# Patient Record
Sex: FEMALE | Race: BLACK OR AFRICAN AMERICAN | Employment: OTHER | URBAN - METROPOLITAN AREA
[De-identification: names, ages, dates, MRNs, and addresses within clinical notes are randomized per-mention and may not be internally consistent; named-entity substitution may affect disease eponyms.]

---

## 2019-07-30 ENCOUNTER — HOSPITAL ENCOUNTER (EMERGENCY)
Facility: HOSPITAL | Age: 69
Discharge: HOME | End: 2019-07-30
Attending: EMERGENCY MEDICINE
Payer: MEDICARE

## 2019-07-30 VITALS
OXYGEN SATURATION: 96 % | SYSTOLIC BLOOD PRESSURE: 141 MMHG | TEMPERATURE: 98 F | HEIGHT: 62 IN | WEIGHT: 190 LBS | RESPIRATION RATE: 18 BRPM | HEART RATE: 91 BPM | BODY MASS INDEX: 34.96 KG/M2 | DIASTOLIC BLOOD PRESSURE: 83 MMHG

## 2019-07-30 DIAGNOSIS — C67.9 MALIGNANT NEOPLASM OF URINARY BLADDER, UNSPECIFIED SITE (HCC): ICD-10-CM

## 2019-07-30 DIAGNOSIS — N30.00 ACUTE CYSTITIS WITHOUT HEMATURIA: Primary | ICD-10-CM

## 2019-07-30 PROCEDURE — 87186 SC STD MICRODIL/AGAR DIL: CPT | Performed by: EMERGENCY MEDICINE

## 2019-07-30 PROCEDURE — 96361 HYDRATE IV INFUSION ADD-ON: CPT

## 2019-07-30 PROCEDURE — 81001 URINALYSIS AUTO W/SCOPE: CPT | Performed by: EMERGENCY MEDICINE

## 2019-07-30 PROCEDURE — 80048 BASIC METABOLIC PNL TOTAL CA: CPT | Performed by: EMERGENCY MEDICINE

## 2019-07-30 PROCEDURE — 96365 THER/PROPH/DIAG IV INF INIT: CPT

## 2019-07-30 PROCEDURE — 85025 COMPLETE CBC W/AUTO DIFF WBC: CPT | Performed by: EMERGENCY MEDICINE

## 2019-07-30 PROCEDURE — 99284 EMERGENCY DEPT VISIT MOD MDM: CPT

## 2019-07-30 PROCEDURE — 87077 CULTURE AEROBIC IDENTIFY: CPT | Performed by: EMERGENCY MEDICINE

## 2019-07-30 PROCEDURE — 87086 URINE CULTURE/COLONY COUNT: CPT | Performed by: EMERGENCY MEDICINE

## 2019-07-30 PROCEDURE — 96360 HYDRATION IV INFUSION INIT: CPT

## 2019-07-30 RX ORDER — SODIUM CHLORIDE 9 MG/ML
INJECTION, SOLUTION INTRAVENOUS CONTINUOUS
Status: DISCONTINUED | OUTPATIENT
Start: 2019-07-30 | End: 2019-07-30

## 2019-07-30 RX ORDER — PHENAZOPYRIDINE HYDROCHLORIDE 100 MG/1
100 TABLET, FILM COATED ORAL 3 TIMES DAILY PRN
Qty: 6 TABLET | Refills: 0 | Status: SHIPPED | OUTPATIENT
Start: 2019-07-30 | End: 2019-08-09 | Stop reason: ALTCHOICE

## 2019-07-30 RX ORDER — CEFADROXIL 500 MG/1
500 CAPSULE ORAL 2 TIMES DAILY
Qty: 20 CAPSULE | Refills: 0 | Status: SHIPPED | OUTPATIENT
Start: 2019-07-30 | End: 2019-08-09

## 2019-07-30 RX ORDER — PHENAZOPYRIDINE HYDROCHLORIDE 200 MG/1
100 TABLET, FILM COATED ORAL ONCE
Status: COMPLETED | OUTPATIENT
Start: 2019-07-30 | End: 2019-07-30

## 2019-07-30 NOTE — ED PROVIDER NOTES
Patient Seen in: [facility] AND [facility] Emergency Department    History   Patient presents with:  Abdomen/Flank Pain (GI/)    Stated Complaint: abd pain    HPI    Patient is here with complaint of increased urinary frequency and incontinence as well she sta All other systems reviewed and negative except as noted above.       Physical Exam     ED Triage Vitals [07/30/19 1443]   /82   Pulse 99   Resp 18   Temp 98.2 °F (36.8 °C)   Temp src Oral   SpO2 96 %   O2 Device None (Room air)       Current:BP 14 tomorrow morning to arrange this follow-up.     ED Course     Labs Reviewed   URINALYSIS WITH CULTURE REFLEX - Abnormal; Notable for the following components:       Result Value    Clarity Urine Cloudy (*)     Protein Urine 100  (*)     Blood Urine Moderate bladder, unspecified site [facility])    Disposition:  Discharge    Follow-up:   Fadia Wells MD  [address] [facility]  [phone]    In 2 days  For re-check    Billy Gold MD  [address]  [id number]

## 2019-07-30 NOTE — CM/SW NOTE
Spoke to pt concerning her wanting to change her health care to [facility] instead of going to [address] [place] [address]. Pt has Medicare and BCBS as her supplement. Pt will be referred to the on call physician if she is discharged home.  I explained to her that she can call th

## 2019-08-08 ENCOUNTER — LAB ENCOUNTER (OUTPATIENT)
Dept: LAB | Facility: HOSPITAL | Age: 69
End: 2019-08-08
Attending: UROLOGY
Payer: MEDICARE

## 2019-08-08 ENCOUNTER — OFFICE VISIT (OUTPATIENT)
Dept: SURGERY | Facility: CLINIC | Age: 69
End: 2019-08-08
Payer: MEDICARE

## 2019-08-08 VITALS
HEART RATE: 79 BPM | DIASTOLIC BLOOD PRESSURE: 78 MMHG | WEIGHT: 180 LBS | TEMPERATURE: 98 F | BODY MASS INDEX: 33 KG/M2 | SYSTOLIC BLOOD PRESSURE: 134 MMHG

## 2019-08-08 DIAGNOSIS — C67.9 MALIGNANT NEOPLASM OF URINARY BLADDER, UNSPECIFIED SITE (HCC): ICD-10-CM

## 2019-08-08 DIAGNOSIS — C67.9 MALIGNANT NEOPLASM OF URINARY BLADDER, UNSPECIFIED SITE (HCC): Primary | ICD-10-CM

## 2019-08-08 LAB
ALBUMIN SERPL-MCNC: 2.8 G/DL (ref 3.4–5)
ALBUMIN/GLOB SERPL: 0.5 {RATIO} (ref 1–2)
ALP LIVER SERPL-CCNC: 83 U/L (ref 55–142)
ALT SERPL-CCNC: 16 U/L (ref 13–56)
ANION GAP SERPL CALC-SCNC: 8 MMOL/L (ref 0–18)
AST SERPL-CCNC: 12 U/L (ref 15–37)
BASOPHILS # BLD AUTO: 0.12 X10(3) UL (ref 0–0.2)
BASOPHILS NFR BLD AUTO: 1.4 %
BILIRUB SERPL-MCNC: 0.2 MG/DL (ref 0.1–2)
BUN BLD-MCNC: 7 MG/DL (ref 7–18)
BUN/CREAT SERPL: 7.3 (ref 10–20)
CALCIUM BLD-MCNC: 9.6 MG/DL (ref 8.5–10.1)
CHLORIDE SERPL-SCNC: 105 MMOL/L (ref 98–112)
CO2 SERPL-SCNC: 29 MMOL/L (ref 21–32)
CREAT BLD-MCNC: 0.96 MG/DL (ref 0.55–1.02)
DEPRECATED RDW RBC AUTO: 41.6 FL (ref 35.1–46.3)
EOSINOPHIL # BLD AUTO: 0.31 X10(3) UL (ref 0–0.7)
EOSINOPHIL NFR BLD AUTO: 3.7 %
ERYTHROCYTE [DISTWIDTH] IN BLOOD BY AUTOMATED COUNT: 15.1 % (ref 11–15)
GLOBULIN PLAS-MCNC: 5.7 G/DL (ref 2.8–4.4)
GLUCOSE BLD-MCNC: 222 MG/DL (ref 70–99)
HCT VFR BLD AUTO: 36.8 % (ref 35–48)
HGB BLD-MCNC: 10.9 G/DL (ref 12–16)
IMM GRANULOCYTES # BLD AUTO: 0.05 X10(3) UL (ref 0–1)
IMM GRANULOCYTES NFR BLD: 0.6 %
LYMPHOCYTES # BLD AUTO: 3.87 X10(3) UL (ref 1–4)
LYMPHOCYTES NFR BLD AUTO: 45.7 %
M PROTEIN MFR SERPL ELPH: 8.5 G/DL (ref 6.4–8.2)
MCH RBC QN AUTO: 22.9 PG (ref 26–34)
MCHC RBC AUTO-ENTMCNC: 29.6 G/DL (ref 31–37)
MCV RBC AUTO: 77.1 FL (ref 80–100)
MONOCYTES # BLD AUTO: 0.59 X10(3) UL (ref 0.1–1)
MONOCYTES NFR BLD AUTO: 7 %
NEUTROPHILS # BLD AUTO: 3.53 X10 (3) UL (ref 1.5–7.7)
NEUTROPHILS # BLD AUTO: 3.53 X10(3) UL (ref 1.5–7.7)
NEUTROPHILS NFR BLD AUTO: 41.6 %
OSMOLALITY SERPL CALC.SUM OF ELEC: 299 MOSM/KG (ref 275–295)
PATIENT FASTING: NO
PLATELET # BLD AUTO: 419 10(3)UL (ref 150–450)
POTASSIUM SERPL-SCNC: 3.8 MMOL/L (ref 3.5–5.1)
RBC # BLD AUTO: 4.77 X10(6)UL (ref 3.8–5.3)
SODIUM SERPL-SCNC: 142 MMOL/L (ref 136–145)
WBC # BLD AUTO: 8.5 X10(3) UL (ref 4–11)

## 2019-08-08 PROCEDURE — 36415 COLL VENOUS BLD VENIPUNCTURE: CPT

## 2019-08-08 PROCEDURE — 80053 COMPREHEN METABOLIC PANEL: CPT

## 2019-08-08 PROCEDURE — 99204 OFFICE O/P NEW MOD 45 MIN: CPT | Performed by: UROLOGY

## 2019-08-08 PROCEDURE — G0463 HOSPITAL OUTPT CLINIC VISIT: HCPCS | Performed by: UROLOGY

## 2019-08-08 PROCEDURE — 85025 COMPLETE CBC W/AUTO DIFF WBC: CPT

## 2019-08-08 RX ORDER — LOSARTAN POTASSIUM 100 MG/1
100 TABLET ORAL
Refills: 2 | COMMUNITY
Start: 2019-06-05

## 2019-08-08 RX ORDER — INSULIN GLARGINE 100 [IU]/ML
INJECTION, SOLUTION SUBCUTANEOUS
Refills: 3 | COMMUNITY
Start: 2019-07-01 | End: 2019-09-19

## 2019-08-08 RX ORDER — GLIPIZIDE 10 MG/1
10 TABLET ORAL 2 TIMES DAILY
Refills: 3 | COMMUNITY
Start: 2019-06-02 | End: 2019-09-19

## 2019-08-08 NOTE — PROGRESS NOTES
SUBJECTIVE:  Lima Garcia is a 68year old female who presents for an office visit regarding  Urologic cancer. She states that the problem is unchanged.  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 Case was discussed at  Tumor Board and consensus opinion was to further attempt a complete transurethral resection. Voided cytology (12/12/2017) showed atypical squamous cells.  A repeat TURBT (2/8/2018) t that she has to strain to urinate, and very little comes out. Valsalva voids.      -reports history of recurrent UTIs in her \"younger days\" around the time she was had her two children: in the 60s-70s: about twice a year for ten years or so     She had a Alcohol use: Not on file    Drug use: Not on file           REVIEW OF SYSTEMS:  RESPIRATORY:  Negative for chest tightness, wheezing, cough, shortness of breath,  sputum production,chest pain or pleurisy.   CARDIOVASCULAR:  Negative for pain or chest discom patient to obtain a CT urogram, chest x-ray and blood work prior to her appointment. Urine for cytology will also be obtained.   My office will try to get the patient a primary care physician here at [place] she would not like to go back to her [facility]

## 2019-08-09 ENCOUNTER — OFFICE VISIT (OUTPATIENT)
Dept: INTERNAL MEDICINE CLINIC | Facility: CLINIC | Age: 69
End: 2019-08-09
Payer: MEDICARE

## 2019-08-09 VITALS
WEIGHT: 181 LBS | RESPIRATION RATE: 17 BRPM | SYSTOLIC BLOOD PRESSURE: 130 MMHG | DIASTOLIC BLOOD PRESSURE: 72 MMHG | HEIGHT: 62 IN | HEART RATE: 91 BPM | BODY MASS INDEX: 33.31 KG/M2

## 2019-08-09 DIAGNOSIS — I10 ESSENTIAL HYPERTENSION: ICD-10-CM

## 2019-08-09 DIAGNOSIS — D64.9 ANEMIA, UNSPECIFIED TYPE: ICD-10-CM

## 2019-08-09 DIAGNOSIS — I89.0 LYMPHEDEMA: ICD-10-CM

## 2019-08-09 DIAGNOSIS — E11.29 TYPE 2 DIABETES MELLITUS WITH MICROALBUMINURIA, WITH LONG-TERM CURRENT USE OF INSULIN (HCC): ICD-10-CM

## 2019-08-09 DIAGNOSIS — C67.9 MALIGNANT NEOPLASM OF URINARY BLADDER, UNSPECIFIED SITE (HCC): Primary | ICD-10-CM

## 2019-08-09 DIAGNOSIS — Z79.4 TYPE 2 DIABETES MELLITUS WITH MICROALBUMINURIA, WITH LONG-TERM CURRENT USE OF INSULIN (HCC): ICD-10-CM

## 2019-08-09 DIAGNOSIS — R80.9 TYPE 2 DIABETES MELLITUS WITH MICROALBUMINURIA, WITH LONG-TERM CURRENT USE OF INSULIN (HCC): ICD-10-CM

## 2019-08-09 DIAGNOSIS — Z85.3 HISTORY OF BREAST CANCER: ICD-10-CM

## 2019-08-09 PROBLEM — E11.9 TYPE 2 DIABETES MELLITUS, WITH LONG-TERM CURRENT USE OF INSULIN (HCC): Status: ACTIVE | Noted: 2019-08-09

## 2019-08-09 PROBLEM — Z87.19 HISTORY OF DIVERTICULITIS: Status: ACTIVE | Noted: 2019-08-09

## 2019-08-09 PROCEDURE — G0463 HOSPITAL OUTPT CLINIC VISIT: HCPCS | Performed by: INTERNAL MEDICINE

## 2019-08-09 PROCEDURE — 99204 OFFICE O/P NEW MOD 45 MIN: CPT | Performed by: INTERNAL MEDICINE

## 2019-08-09 NOTE — PATIENT INSTRUCTIONS
Diet: Diabetes  Food is an important tool that you can use to control diabetes and stay healthy. Eating well-balanced meals in the correct amounts will help you control your blood glucose levels and prevent low blood sugar reactions.  It will also help yo · Avoid added salt. It can contribute to high blood pressure, which can cause heart disease. People with diabetes already have a risk of high blood pressure and heart disease. · Stay at a healthy weight.  If you need to lose weight, cut down on your portio · Unsalted fresh fruit and vegetables, or frozen or canned fruit and vegetables with no added salt  Stay away from:  · Lunch or deli meat that is cured or smoked  · Cheese  · Tomato juice and ketchup  · Canned vegetables, soups, and fish not labeled as no- Best choices: Whole grains and any grains high in fiber.  Vegetables  Servings: 4 to 5 a day  A serving is:  · 1 cup raw leafy vegetable  · Half a cup cut-up raw or cooked vegetable  · Half a cup vegetable juice  Best choices: Fresh or frozen vegetables pre Best choices: Dried fruit can be a satisfying sweet. Choose low-fat sweets.  And watch your serving sizes!      For more on the DASH eating plan, visit:  www.nhlbi.nih.gov/health/health-topics/topics/dash   Date Last Reviewed: 6/1/2016  © 5017-3221 [employer]

## 2019-08-09 NOTE — PROGRESS NOTES
Monique Conroy is a 68year old female.   Patient presents with:  Establish Care      HPI:   Pt comes as a new pt --referred by dr Gabbie Diaz   C/c  Bladder cancer and dm2   C/o on abx for uti   Urinates frequently and not taking phenazopyridine   Saw urology yest antibiotics, change in the color of urine, + discharge it is better now was very dark before but now clear,+ urinating frequently, +urgency , no hesitancy   NEURO: denies headaches , anxiety, depression    EXAM:   /72   Pulse 91   Resp 17   Ht 5' 2\"

## 2019-08-13 ENCOUNTER — TELEPHONE (OUTPATIENT)
Dept: SURGERY | Facility: CLINIC | Age: 69
End: 2019-08-13

## 2019-08-13 NOTE — TELEPHONE ENCOUNTER
Staff: Please offer patient a consult appointment to see me on Monday 8/19/2019. She is referred by Dr. Imer Teran for bladder cancer. This should be a 60 minute cancer discussion.      I have asked Red Chatterjee to open up my office schedule for consults on that day

## 2019-08-15 ENCOUNTER — HOSPITAL ENCOUNTER (OUTPATIENT)
Dept: CT IMAGING | Facility: HOSPITAL | Age: 69
Discharge: HOME | End: 2019-08-15
Attending: UROLOGY
Payer: MEDICARE

## 2019-08-15 ENCOUNTER — LAB ENCOUNTER (OUTPATIENT)
Dept: LAB | Facility: HOSPITAL | Age: 69
End: 2019-08-15
Attending: UROLOGY
Payer: MEDICARE

## 2019-08-15 DIAGNOSIS — R80.9 TYPE 2 DIABETES MELLITUS WITH MICROALBUMINURIA, WITH LONG-TERM CURRENT USE OF INSULIN (HCC): ICD-10-CM

## 2019-08-15 DIAGNOSIS — D64.9 ANEMIA, UNSPECIFIED TYPE: ICD-10-CM

## 2019-08-15 DIAGNOSIS — E11.29 TYPE 2 DIABETES MELLITUS WITH MICROALBUMINURIA, WITH LONG-TERM CURRENT USE OF INSULIN (HCC): ICD-10-CM

## 2019-08-15 DIAGNOSIS — Z79.4 TYPE 2 DIABETES MELLITUS WITH MICROALBUMINURIA, WITH LONG-TERM CURRENT USE OF INSULIN (HCC): ICD-10-CM

## 2019-08-15 DIAGNOSIS — C67.9 MALIGNANT NEOPLASM OF URINARY BLADDER, UNSPECIFIED SITE (HCC): ICD-10-CM

## 2019-08-15 LAB
BASOPHILS # BLD AUTO: 0.13 X10(3) UL (ref 0–0.2)
BASOPHILS NFR BLD AUTO: 1.5 %
CHOLEST SMN-MCNC: 168 MG/DL (ref ?–200)
DEPRECATED RDW RBC AUTO: 43.8 FL (ref 35.1–46.3)
EOSINOPHIL # BLD AUTO: 0.4 X10(3) UL (ref 0–0.7)
EOSINOPHIL NFR BLD AUTO: 4.7 %
ERYTHROCYTE [DISTWIDTH] IN BLOOD BY AUTOMATED COUNT: 15.9 % (ref 11–15)
EST. AVERAGE GLUCOSE BLD GHB EST-MCNC: 255 MG/DL (ref 68–126)
HBA1C MFR BLD HPLC: 10.5 % (ref ?–5.7)
HCT VFR BLD AUTO: 36.3 % (ref 35–48)
HDLC SERPL-MCNC: 69 MG/DL (ref 40–59)
HGB BLD-MCNC: 11 G/DL (ref 12–16)
IMM GRANULOCYTES # BLD AUTO: 0.02 X10(3) UL (ref 0–1)
IMM GRANULOCYTES NFR BLD: 0.2 %
LDLC SERPL CALC-MCNC: 74 MG/DL (ref ?–100)
LYMPHOCYTES # BLD AUTO: 3.33 X10(3) UL (ref 1–4)
LYMPHOCYTES NFR BLD AUTO: 39.2 %
MCH RBC QN AUTO: 23.4 PG (ref 26–34)
MCHC RBC AUTO-ENTMCNC: 30.3 G/DL (ref 31–37)
MCV RBC AUTO: 77.1 FL (ref 80–100)
MONOCYTES # BLD AUTO: 0.73 X10(3) UL (ref 0.1–1)
MONOCYTES NFR BLD AUTO: 8.6 %
NEUTROPHILS # BLD AUTO: 3.88 X10 (3) UL (ref 1.5–7.7)
NEUTROPHILS # BLD AUTO: 3.88 X10(3) UL (ref 1.5–7.7)
NEUTROPHILS NFR BLD AUTO: 45.8 %
NONHDLC SERPL-MCNC: 99 MG/DL (ref ?–130)
PATIENT FASTING: YES
PLATELET # BLD AUTO: 301 10(3)UL (ref 150–450)
RBC # BLD AUTO: 4.71 X10(6)UL (ref 3.8–5.3)
TRIGL SERPL-MCNC: 124 MG/DL (ref 30–149)
VLDLC SERPL CALC-MCNC: 25 MG/DL (ref 0–30)
WBC # BLD AUTO: 8.5 X10(3) UL (ref 4–11)

## 2019-08-15 PROCEDURE — 74176 CT ABD & PELVIS W/O CONTRAST: CPT | Performed by: UROLOGY

## 2019-08-15 PROCEDURE — 80061 LIPID PANEL: CPT

## 2019-08-15 PROCEDURE — 71250 CT THORAX DX C-: CPT | Performed by: UROLOGY

## 2019-08-15 PROCEDURE — 83036 HEMOGLOBIN GLYCOSYLATED A1C: CPT

## 2019-08-15 PROCEDURE — 85025 COMPLETE CBC W/AUTO DIFF WBC: CPT

## 2019-08-15 PROCEDURE — 36415 COLL VENOUS BLD VENIPUNCTURE: CPT

## 2019-08-19 ENCOUNTER — OFFICE VISIT (OUTPATIENT)
Dept: SURGERY | Facility: CLINIC | Age: 69
End: 2019-08-19
Payer: MEDICARE

## 2019-08-19 ENCOUNTER — TELEPHONE (OUTPATIENT)
Dept: SURGERY | Facility: CLINIC | Age: 69
End: 2019-08-19

## 2019-08-19 VITALS
RESPIRATION RATE: 16 BRPM | SYSTOLIC BLOOD PRESSURE: 124 MMHG | HEIGHT: 62 IN | BODY MASS INDEX: 33.31 KG/M2 | HEART RATE: 87 BPM | DIASTOLIC BLOOD PRESSURE: 80 MMHG | WEIGHT: 181 LBS

## 2019-08-19 DIAGNOSIS — C67.9 MALIGNANT NEOPLASM OF URINARY BLADDER, UNSPECIFIED SITE (HCC): Primary | ICD-10-CM

## 2019-08-19 LAB
BILIRUB UR QL: NEGATIVE
CLARITY UR: CLEAR
COLOR UR: YELLOW
GLUCOSE UR-MCNC: NEGATIVE MG/DL
KETONES UR-MCNC: NEGATIVE MG/DL
NITRITE UR QL STRIP.AUTO: NEGATIVE
PH UR: 6 [PH] (ref 5–8)
PROT UR-MCNC: 100 MG/DL
RBC #/AREA URNS AUTO: 5 /HPF
SP GR UR STRIP: 1.01 (ref 1–1.03)
UROBILINOGEN UR STRIP-ACNC: <2
VIT C UR-MCNC: NEGATIVE MG/DL
WBC #/AREA URNS AUTO: 6 /HPF

## 2019-08-19 PROCEDURE — G0463 HOSPITAL OUTPT CLINIC VISIT: HCPCS | Performed by: UROLOGY

## 2019-08-19 PROCEDURE — 99354 PROLONGED SERV,OFFICE,1ST HR: CPT | Performed by: UROLOGY

## 2019-08-19 PROCEDURE — 99214 OFFICE O/P EST MOD 30 MIN: CPT | Performed by: UROLOGY

## 2019-08-19 NOTE — PROGRESS NOTES
[facility], [facility] Urologic Oncology  Initial Office Consultation    HPI:   Abi Darnell is a 68year old female here today for consultation at the request of, and a copy of this note will be sent to, Kelsea Hernandez MD in regards to [facility] of the bladder.   She is biopsied and found to represent squamous papilloma with acute inflammation. Follow-up cystoscopy in 05/2018 revealed white plaques covering almost 80% of the bladder mucosa. Bladder barbotage cytology showed dysplastic squamous cells.   Treated empirica These findings are concerning for metastatic disease. Otherwise, patient denies any bone pain or unintentional weight loss.  She has baseline voiding symptoms that have been long-standing and include urgency, frequency every 30 minutes to 1 hour and freq Vitals reviewed. Constitutional: She is oriented to person, place, and time. She appears well-developed and well-nourished. No distress. HENT:   Head: Atraumatic. Eyes: Conjunctivae are normal.   Neck: Normal range of motion.    Cardiovascular: Norm reviewing all available outside medical records, radiographs, and laboratory studies, I had a lengthy discussion with the patient and their accompanying family members regarding implications of a diagnosis of squamous cell carcinoma of the bladder.  We disc recent dry CT C/A/P. Further management will be based on the results.     2. Send urine for analysis and reflex culture. 3. Will discuss at our upcoming multidisciplinary urologic oncology tumor conference.     At the end of the visit, 55 mintues were

## 2019-08-30 ENCOUNTER — HOSPITAL ENCOUNTER (OUTPATIENT)
Dept: NUCLEAR MEDICINE | Facility: HOSPITAL | Age: 69
Discharge: HOME | End: 2019-08-30
Attending: UROLOGY
Payer: MEDICARE

## 2019-08-30 DIAGNOSIS — C67.9 MALIGNANT NEOPLASM OF URINARY BLADDER, UNSPECIFIED SITE (HCC): ICD-10-CM

## 2019-08-30 LAB — GLUCOSE BLDC GLUCOMTR-MCNC: 146 MG/DL (ref 70–99)

## 2019-08-30 PROCEDURE — 78815 PET IMAGE W/CT SKULL-THIGH: CPT | Performed by: UROLOGY

## 2019-08-30 PROCEDURE — 82962 GLUCOSE BLOOD TEST: CPT

## 2019-09-04 ENCOUNTER — OFFICE VISIT (OUTPATIENT)
Dept: PODIATRY CLINIC | Facility: CLINIC | Age: 69
End: 2019-09-04
Payer: MEDICARE

## 2019-09-04 DIAGNOSIS — B35.1 ONYCHOMYCOSIS: ICD-10-CM

## 2019-09-04 DIAGNOSIS — M79.674 PAIN IN TOES OF BOTH FEET: ICD-10-CM

## 2019-09-04 DIAGNOSIS — R80.9 TYPE 2 DIABETES MELLITUS WITH MICROALBUMINURIA, WITH LONG-TERM CURRENT USE OF INSULIN (HCC): Primary | ICD-10-CM

## 2019-09-04 DIAGNOSIS — M79.675 PAIN IN TOES OF BOTH FEET: ICD-10-CM

## 2019-09-04 DIAGNOSIS — E11.29 TYPE 2 DIABETES MELLITUS WITH MICROALBUMINURIA, WITH LONG-TERM CURRENT USE OF INSULIN (HCC): Primary | ICD-10-CM

## 2019-09-04 DIAGNOSIS — Z79.4 TYPE 2 DIABETES MELLITUS WITH MICROALBUMINURIA, WITH LONG-TERM CURRENT USE OF INSULIN (HCC): Primary | ICD-10-CM

## 2019-09-04 PROCEDURE — 11721 DEBRIDE NAIL 6 OR MORE: CPT | Performed by: PODIATRIST

## 2019-09-04 PROCEDURE — 99203 OFFICE O/P NEW LOW 30 MIN: CPT | Performed by: PODIATRIST

## 2019-09-04 PROCEDURE — G0463 HOSPITAL OUTPT CLINIC VISIT: HCPCS | Performed by: PODIATRIST

## 2019-09-04 RX ORDER — ERGOCALCIFEROL 1.25 MG/1
CAPSULE ORAL
Refills: 3 | COMMUNITY
Start: 2019-08-24

## 2019-09-04 NOTE — PROGRESS NOTES
HPI:    Patient ID: Erna Ulloa is a 68year old female.   This 70-year-old diabetic presents as a new patient to me on referral from .  Patient states that she is here for a diabetic foot evaluation and is in need of care associated with her painf nail, subungual debris, and some keratosis. Upon debridement there is no ulceration or infection no open or draining was noted.   She is well-informed and I would anticipate follow-up if symptoms redevelop         ASSESSMENT/PLAN:   Type 2 diabetes mellitu

## 2019-09-11 ENCOUNTER — OFFICE VISIT (OUTPATIENT)
Dept: SURGERY | Facility: CLINIC | Age: 69
End: 2019-09-11
Payer: MEDICARE

## 2019-09-11 ENCOUNTER — TELEPHONE (OUTPATIENT)
Dept: SURGERY | Facility: CLINIC | Age: 69
End: 2019-09-11

## 2019-09-11 VITALS
TEMPERATURE: 98 F | BODY MASS INDEX: 33.31 KG/M2 | HEIGHT: 62 IN | SYSTOLIC BLOOD PRESSURE: 144 MMHG | DIASTOLIC BLOOD PRESSURE: 79 MMHG | HEART RATE: 79 BPM | WEIGHT: 181 LBS

## 2019-09-11 DIAGNOSIS — C67.9 MALIGNANT NEOPLASM OF URINARY BLADDER, UNSPECIFIED SITE (HCC): Primary | ICD-10-CM

## 2019-09-11 PROCEDURE — G0463 HOSPITAL OUTPT CLINIC VISIT: HCPCS | Performed by: UROLOGY

## 2019-09-11 PROCEDURE — 99215 OFFICE O/P EST HI 40 MIN: CPT | Performed by: UROLOGY

## 2019-09-11 NOTE — PATIENT INSTRUCTIONS
1. Obtain the actual glass pathology slides from [facility] and bring them to our office so our pathologists can review them. 2. Go to see the endocrinologist for better management and optimization of your diabetes.     3. You need to see your pr and drains into a bag worn outside the body, under your clothes. You’ll need to wear a bag all of the time. And you’ll need to empty and change the bag regularly. You’ll also need to take care of your stoma and the skin around it.  You’ll be taught how to d tube is inserted into your back to deliver pain medicine that numbs the lower body. Talk to your healthcare provider or anesthesiologist about this option. During the surgery:  · An incision is made in the lower belly.   · The lymph nodes near the bladder will start on a liquid diet. You will then slowly return to a normal diet. · As soon as you’re able, you will get up and walk. · You’ll be taught coughing and breathing techniques to help keep your lungs clear and prevent pneumonia.   · An ostomy nurse wi site, such as increased redness or swelling, warmth, worsening pain, or foul-smelling drainage  · Pain, redness, swelling, odor, or drainage at the stoma site  · Decreased or no urine output for longer than 4 hours  · Bloody urine with clots  · Pain that c

## 2019-09-11 NOTE — TELEPHONE ENCOUNTER
PET scan done on 8/30 is not passing medical necessity  - asking for order updated with codes to pass ins

## 2019-09-12 ENCOUNTER — TELEPHONE (OUTPATIENT)
Dept: OTHER | Age: 69
End: 2019-09-12

## 2019-09-12 NOTE — PROGRESS NOTES
[facility], [facility] Urology  Follow-Up Visit    HPI: Teddy Alegria is a 68year old female presents for a follow up visit. Patient was last seen on 8/19/2019. Accompanied by her grand-daughter.     INTERVAL HISTORY: Here to discuss results of PET-CT and further re represent squamous papilloma with acute inflammation.     Follow-up cystoscopy in 05/2018 revealed white plaques covering almost 80% of the bladder mucosa. Bladder barbotage cytology showed dysplastic squamous cells.   Treated empirically for possible maria teresa are concerning for metastatic disease. \"     She has baseline voiding symptoms that have been long-standing and include urgency, frequency every 30 minutes to 1 hour and frequent urge incontinence. She denies any recent gross hematuria or dysuria.  She comp present. Bladder biopsy at [facility] (10/2018): Squamous cell carcinoma in situ at the right lateral wall and bladder dome biopsy sites. No deep muscle present. Bladder biopsy at [facility] (2/2018):  Noninvasive low-grade papillary urothelial carcinoma with sq right lower lobe may be sequela of inflammation or infection.  Followup chest CT recommended in 6 months to demonstrate resolution.        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   In this series, issues that were seen with ileal conduits were as follows: ureteral stricture/obstruc from [facility] and submit them here for review and to confirm the logic diagnosis of squamous cell carcinoma in situ.     2. Patient requires preoperative medical optimization and clearance by internal medicine as well as cardiac risk

## 2019-09-12 NOTE — TELEPHONE ENCOUNTER
I talked to the patient and was informed. She will call her grand daughter to see when she is available and will call us back to schedule. Several dates were given where availability was.     : Johanne Sandifer, MD (Physician)        Show:Clear all

## 2019-09-12 NOTE — PROGRESS NOTES
Dr Yolanda Howard --Thank you for the update   nusrse-- pls help pt with f/u apt to see me after she sees endo   ty

## 2019-09-13 ENCOUNTER — OFFICE VISIT (OUTPATIENT)
Dept: ENDOCRINOLOGY CLINIC | Facility: CLINIC | Age: 69
End: 2019-09-13
Payer: MEDICARE

## 2019-09-13 VITALS
SYSTOLIC BLOOD PRESSURE: 167 MMHG | HEART RATE: 69 BPM | BODY MASS INDEX: 33 KG/M2 | WEIGHT: 179 LBS | DIASTOLIC BLOOD PRESSURE: 88 MMHG

## 2019-09-13 DIAGNOSIS — E11.65 TYPE 2 DIABETES MELLITUS WITH HYPERGLYCEMIA, UNSPECIFIED WHETHER LONG TERM INSULIN USE (HCC): Primary | ICD-10-CM

## 2019-09-13 LAB
GLUCOSE BLOOD: 83
TEST STRIP LOT #: NORMAL NUMERIC

## 2019-09-13 PROCEDURE — 82962 GLUCOSE BLOOD TEST: CPT | Performed by: INTERNAL MEDICINE

## 2019-09-13 PROCEDURE — 99204 OFFICE O/P NEW MOD 45 MIN: CPT | Performed by: INTERNAL MEDICINE

## 2019-09-13 PROCEDURE — 36416 COLLJ CAPILLARY BLOOD SPEC: CPT | Performed by: INTERNAL MEDICINE

## 2019-09-13 NOTE — PATIENT INSTRUCTIONS
On Monday:  DO not take glipizide  DO not take basaglar  Start insulin 70/30:  Take 20 units with breakfast and 15 units with dinner  Check sugars before breakfast and before lunch  Call with sugars on Monday    [phone]    Dr. Perlita Rojas  Endocrinology

## 2019-09-13 NOTE — H&P
New Patient Visit - Diabetes    CONSULT - Reason for Visit:  Diabetes management. Requesting Physician:  Dr. Palma Sena:  Patient presents with:  Consult: DM type 2 consult. Diagnosed about 10 years ago (give or take).      She needs gly vascular accident) [facility]) 2013    no residual deficit   • Diabetes ([facility] [address]    • Diverticulosis of large intestine    • Essential hypertension        PAST SURGICAL HISTORY:   Past Surgical History:   Procedure Laterality Date   • BREAST LUMPECTOMY Left 04/2010 bruising, lower extremity edema  Endocrine: Negative for: polyuria, polydypsia  Skin: Negative for: rash, blister,      PHYSICAL EXAM:    09/13/19  1151   BP: (!) 167/88   Pulse: 69   Weight: 179 lb (81.2 kg)     BMI: Body mass index is 32.74 kg/m².      CO Discussed lifestyle modifications including reductions in dietary total and saturated fat, weight loss, aerobic exercise, and eating a diet rich in fruits and vegetables.   Patient verbalized a complete  understanding of all of the above and did not have an

## 2019-09-14 ENCOUNTER — TELEPHONE (OUTPATIENT)
Dept: ENDOCRINOLOGY CLINIC | Facility: CLINIC | Age: 69
End: 2019-09-14

## 2019-09-16 NOTE — TELEPHONE ENCOUNTER
Pt just started insulin 70/30 20,15 today. RN advised CPM and call Wed with sugars. Forwarded to AM for review.

## 2019-09-17 ENCOUNTER — LAB REQUISITION (OUTPATIENT)
Dept: LAB | Facility: HOSPITAL | Age: 69
End: 2019-09-17
Payer: MEDICARE

## 2019-09-17 ENCOUNTER — TELEPHONE (OUTPATIENT)
Dept: SURGERY | Facility: CLINIC | Age: 69
End: 2019-09-17

## 2019-09-17 DIAGNOSIS — Z01.89 ENCOUNTER FOR OTHER SPECIFIED SPECIAL EXAMINATIONS: ICD-10-CM

## 2019-09-17 DIAGNOSIS — C67.9 MALIGNANT NEOPLASM OF URINARY BLADDER, UNSPECIFIED SITE (HCC): Primary | ICD-10-CM

## 2019-09-17 PROCEDURE — 88321 CONSLTJ&REPRT SLD PREP ELSWR: CPT | Performed by: UROLOGY

## 2019-09-17 NOTE — TELEPHONE ENCOUNTER
Pathology slides taken down to medical records. Per [facility], called patient and informed her that we require pathology slides from 2018. Patient stated she will acquire 2018 slides and will drop them off as soon as she can.  Informed patient to call us if she ha

## 2019-09-17 NOTE — TELEPHONE ENCOUNTER
Called pathology at [phone] and spoke with Arina Jordan. Juan Lockhart requested patient bring in pathology slides from [facility].  Brigette Tan stated that order must be entered in Epic under miscellaneous and in comments must state, Surgical pa

## 2019-09-17 NOTE — TELEPHONE ENCOUNTER
Pt called stating pt dropped off slides from 2019. pt was advise now to get slides from 2018. Pt called narciso and was told the doctors office can request the slides include a fedex label to return. Pt does not have a fax number.    Please let pt know if t

## 2019-09-17 NOTE — TELEPHONE ENCOUNTER
Pt dropped off slides and a report that Dr Klever Fried neeed. Dr asked that clinical staff to  Call  Pathology and see what we need to do with the slides.  Dr Klever Fried  Also asked that Pt be called to let her know he needs the reports from 2018 that show her

## 2019-09-18 ENCOUNTER — TELEPHONE (OUTPATIENT)
Dept: ENDOCRINOLOGY CLINIC | Facility: CLINIC | Age: 69
End: 2019-09-18

## 2019-09-18 NOTE — TELEPHONE ENCOUNTER
BG #s - before meals     Date  AM  PM    9/17/2019 117  84  9/18/2019 130  242    Pls call. Thank you.

## 2019-09-18 NOTE — TELEPHONE ENCOUNTER
max Garduno called to check status on the pet order. Re faxed the original order to the office with a note. Need updated order.

## 2019-09-19 ENCOUNTER — OFFICE VISIT (OUTPATIENT)
Dept: INTERNAL MEDICINE CLINIC | Facility: CLINIC | Age: 69
End: 2019-09-19
Payer: MEDICARE

## 2019-09-19 ENCOUNTER — APPOINTMENT (OUTPATIENT)
Dept: LAB | Age: 69
End: 2019-09-19
Attending: INTERNAL MEDICINE
Payer: MEDICARE

## 2019-09-19 VITALS
WEIGHT: 179 LBS | HEART RATE: 78 BPM | HEIGHT: 62 IN | DIASTOLIC BLOOD PRESSURE: 88 MMHG | SYSTOLIC BLOOD PRESSURE: 147 MMHG | BODY MASS INDEX: 32.94 KG/M2

## 2019-09-19 DIAGNOSIS — R94.31 ABNORMAL EKG: ICD-10-CM

## 2019-09-19 DIAGNOSIS — I10 ESSENTIAL HYPERTENSION: Primary | ICD-10-CM

## 2019-09-19 DIAGNOSIS — C67.8 MALIGNANT NEOPLASM OF OVERLAPPING SITES OF BLADDER (HCC): ICD-10-CM

## 2019-09-19 DIAGNOSIS — I10 ESSENTIAL HYPERTENSION: ICD-10-CM

## 2019-09-19 DIAGNOSIS — C67.9 MALIGNANT NEOPLASM OF URINARY BLADDER, UNSPECIFIED SITE (HCC): Primary | ICD-10-CM

## 2019-09-19 DIAGNOSIS — E11.29 TYPE 2 DIABETES MELLITUS WITH MICROALBUMINURIA, WITH LONG-TERM CURRENT USE OF INSULIN (HCC): ICD-10-CM

## 2019-09-19 DIAGNOSIS — I89.0 LYMPHEDEMA: ICD-10-CM

## 2019-09-19 DIAGNOSIS — Z79.4 TYPE 2 DIABETES MELLITUS WITH MICROALBUMINURIA, WITH LONG-TERM CURRENT USE OF INSULIN (HCC): ICD-10-CM

## 2019-09-19 DIAGNOSIS — E11.65 TYPE 2 DIABETES MELLITUS WITH HYPERGLYCEMIA, UNSPECIFIED WHETHER LONG TERM INSULIN USE (HCC): ICD-10-CM

## 2019-09-19 DIAGNOSIS — R80.9 TYPE 2 DIABETES MELLITUS WITH MICROALBUMINURIA, WITH LONG-TERM CURRENT USE OF INSULIN (HCC): ICD-10-CM

## 2019-09-19 LAB
CREAT UR-SCNC: 67 MG/DL
MICROALBUMIN UR-MCNC: 59.2 MG/DL
MICROALBUMIN/CREAT 24H UR-RTO: 883.6 UG/MG (ref ?–30)

## 2019-09-19 PROCEDURE — 93005 ELECTROCARDIOGRAM TRACING: CPT

## 2019-09-19 PROCEDURE — 93010 ELECTROCARDIOGRAM REPORT: CPT | Performed by: INTERNAL MEDICINE

## 2019-09-19 PROCEDURE — G0463 HOSPITAL OUTPT CLINIC VISIT: HCPCS | Performed by: INTERNAL MEDICINE

## 2019-09-19 PROCEDURE — 82043 UR ALBUMIN QUANTITATIVE: CPT

## 2019-09-19 PROCEDURE — 99214 OFFICE O/P EST MOD 30 MIN: CPT | Performed by: INTERNAL MEDICINE

## 2019-09-19 PROCEDURE — 82570 ASSAY OF URINE CREATININE: CPT

## 2019-09-19 NOTE — TELEPHONE ENCOUNTER
Called patient and informed her that our supervisor contacted [place] [facility]. We asked our pathology lab if there was a way to acquire 2018 pathology slides from [facility] without having patient make the trip to [facility] again.  Pathology informed us that

## 2019-09-19 NOTE — PROGRESS NOTES
Monique Conroy is a 68year old female.   Patient presents with:  Diabetes: follow up      HPI:   Pt comes here with her granddaughter shannon   C/c dm  C/o saw a lot of drs since the last visit-- endo,urology and podiatry  etc   Not date for surg   Had surg Apr Essential hypertension       Past Surgical History:   Procedure Laterality Date   • Breast lumpectomy Left 2010    + adjuvant RT. Declined hormonal therapy   •       x2   • Cystourethroscopy,krystalr . 5-2cm rde      @ [address] [place] [address] (2017, 2017,  sugars 140s   Hba1c 10.5 on 8/19  U.  Micro will get it done today   Eye exam 5/1/19 no retinopathy    intolerance Asa ,allergy acei, on arb ,consider  statin   Foot -dr rg 9/19   Diet -- advised to follow a low carb, low sugar diet , try to be HARISH Allen

## 2019-09-19 NOTE — TELEPHONE ENCOUNTER
Spoke to [address] [place], he is aware. Routed to [facility]. Thank you Fernanda

## 2019-09-19 NOTE — TELEPHONE ENCOUNTER
CPM  Call if under 70 or persistently over 180 ( needs tight Bg control in preparation for surgery), otherwise I will see her on 10/15  Thanks

## 2019-09-19 NOTE — TELEPHONE ENCOUNTER
Dr. Alicia Dai, please sign referral to receive 2018 pathology slides from [facility].

## 2019-09-27 ENCOUNTER — LAB REQUISITION (OUTPATIENT)
Dept: LAB | Facility: HOSPITAL | Age: 69
End: 2019-09-27
Payer: MEDICARE

## 2019-09-27 DIAGNOSIS — C67.9 MALIGNANT NEOPLASM OF BLADDER (HCC): ICD-10-CM

## 2019-09-27 PROCEDURE — 88321 CONSLTJ&REPRT SLD PREP ELSWR: CPT | Performed by: UROLOGY

## 2019-09-27 NOTE — TELEPHONE ENCOUNTER
[facility] referral external entered for October 2018 pathology slides. Sent to Dr. Vincent Sharp.

## 2019-10-08 ENCOUNTER — TELEPHONE (OUTPATIENT)
Dept: ENDOCRINOLOGY CLINIC | Facility: CLINIC | Age: 69
End: 2019-10-08

## 2019-10-08 RX ORDER — BLOOD-GLUCOSE METER
EACH MISCELLANEOUS
Qty: 1 KIT | Refills: 0 | Status: SHIPPED | OUTPATIENT
Start: 2019-10-08

## 2019-10-08 NOTE — TELEPHONE ENCOUNTER
Zana Babinski understanding of instructions to increase 70/30 in the mornin units --> 24 units. Confirmed follow up on 10/15. She's requesting new OneTouch Ultra 2 glucometer. Prescribed per protocol.

## 2019-10-08 NOTE — TELEPHONE ENCOUNTER
Spoke with pt. States her BG before breakfast has been 115-120. She reports having whole wheat toast for breakfast.  No protein. Readings before lunch was 195 so she immediately rechecked it and got 178. She is concerned about high lunch time readings.

## 2019-10-09 ENCOUNTER — LAB REQUISITION (OUTPATIENT)
Dept: LAB | Facility: HOSPITAL | Age: 69
End: 2019-10-09
Payer: MEDICARE

## 2019-10-09 DIAGNOSIS — D09.0 CARCINOMA IN SITU OF BLADDER: ICD-10-CM

## 2019-10-09 PROCEDURE — 88321 CONSLTJ&REPRT SLD PREP ELSWR: CPT | Performed by: UROLOGY

## 2019-10-15 ENCOUNTER — OFFICE VISIT (OUTPATIENT)
Dept: ENDOCRINOLOGY CLINIC | Facility: CLINIC | Age: 69
End: 2019-10-15
Payer: MEDICARE

## 2019-10-15 VITALS
DIASTOLIC BLOOD PRESSURE: 84 MMHG | WEIGHT: 180.81 LBS | HEART RATE: 83 BPM | SYSTOLIC BLOOD PRESSURE: 152 MMHG | BODY MASS INDEX: 33 KG/M2

## 2019-10-15 DIAGNOSIS — E11.65 TYPE 2 DIABETES MELLITUS WITH HYPERGLYCEMIA, UNSPECIFIED WHETHER LONG TERM INSULIN USE (HCC): Primary | ICD-10-CM

## 2019-10-15 PROCEDURE — 82962 GLUCOSE BLOOD TEST: CPT | Performed by: INTERNAL MEDICINE

## 2019-10-15 PROCEDURE — 36416 COLLJ CAPILLARY BLOOD SPEC: CPT | Performed by: INTERNAL MEDICINE

## 2019-10-15 PROCEDURE — 83036 HEMOGLOBIN GLYCOSYLATED A1C: CPT | Performed by: INTERNAL MEDICINE

## 2019-10-15 PROCEDURE — 99213 OFFICE O/P EST LOW 20 MIN: CPT | Performed by: INTERNAL MEDICINE

## 2019-10-15 RX ORDER — PEN NEEDLE, DIABETIC 30 GX3/16"
1 NEEDLE, DISPOSABLE MISCELLANEOUS 2 TIMES DAILY
Qty: 200 EACH | Refills: 0 | Status: SHIPPED | OUTPATIENT
Start: 2019-10-15 | End: 2020-03-04

## 2019-10-15 NOTE — PROGRESS NOTES
Return Office Visit     CHIEF COMPLAINT:  Patient presents with:   Follow - Up: A1c       HISTORY OF PRESENT ILLNESS:  José Miguel Solis is a 68year old female who presents for follow up for for DM.      DM HISTORY  Diagnosed:  Around age 62        HISTORY OF PARK palpitations, orthopnea  GI: Negative for:  abdominal pain, nausea, vomiting, diarrhea, constipation, bleeding  Neurology: Negative for: headache, numbness, weakness  Genito-Urinary: Negative for: dysuria, frequency  Psychiatric: Negative for:  depression, on next visit. f). Life style changes reviewed  g). Hypoglycemia recognition and management discussed     2. Patient’s BP is high today, monitor at home, low salt diet  3.  Lipid panel reviewed  A) Discussed lifestyle modifications including reductions in

## 2019-10-16 ENCOUNTER — TELEPHONE (OUTPATIENT)
Dept: ENDOCRINOLOGY CLINIC | Facility: CLINIC | Age: 69
End: 2019-10-16

## 2019-10-16 RX ORDER — HUMAN INSULIN 100 [IU]/ML
INJECTION, SUSPENSION SUBCUTANEOUS
Qty: 36 ML | Refills: 0 | Status: SHIPPED | OUTPATIENT
Start: 2019-10-16 | End: 2019-10-28

## 2019-10-16 NOTE — TELEPHONE ENCOUNTER
Insulin NPH & Regular (HUMULIN 70/30 KWIKPEN) (70-30) 100 UNIT/ML Subcutaneous Suspension Pen-injector, Take 24 units with breakfast 15 units with dinner, Disp: 45 mL, Rfl: 0    Per pharmacy med not covered Insurance will cover NOVOLIN pls clarify

## 2019-10-23 ENCOUNTER — OFFICE VISIT (OUTPATIENT)
Dept: SURGERY | Facility: CLINIC | Age: 69
End: 2019-10-23
Payer: MEDICARE

## 2019-10-23 VITALS
TEMPERATURE: 98 F | DIASTOLIC BLOOD PRESSURE: 84 MMHG | SYSTOLIC BLOOD PRESSURE: 129 MMHG | HEART RATE: 87 BPM | WEIGHT: 180 LBS | HEIGHT: 62 IN | BODY MASS INDEX: 33.13 KG/M2

## 2019-10-23 DIAGNOSIS — C67.8 MALIGNANT NEOPLASM OF OVERLAPPING SITES OF BLADDER (HCC): Primary | ICD-10-CM

## 2019-10-23 PROCEDURE — 99214 OFFICE O/P EST MOD 30 MIN: CPT | Performed by: UROLOGY

## 2019-10-23 PROCEDURE — G0463 HOSPITAL OUTPT CLINIC VISIT: HCPCS | Performed by: UROLOGY

## 2019-10-24 NOTE — PROGRESS NOTES
[facility], [facility] Urology  Follow-Up Visit    HPI: Maria Fernanda Crawford is a 68year old female presents for a follow up visit. Patient was last seen on 9/11/2019. Accompanied by her daughter.     INTERVAL HISTORY: Pathology slides obtained from [facility] and r carcinoma in situ.   Repeat TURBT performed in 02/2018 revealed low-grade noninvasive withUCC squamous metaplasia, as well as white patches at the left lateral wall biopsied and found to represent squamous papilloma with acute inflammation.     Follow-up cy inguinal, and retroperitoneal lymphadenopathy. Pelvic lymph nodes measuring up to 1.8 cm in the right obturator region, and a left periaortic lymph node measuring 1.4 x 0.8 cm. These findings are concerning for metastatic disease. \"     She has baseline vo obese abdomen. Neurological: She is alert and oriented to person, place, and time. Skin: Skin is warm and dry. Psychiatric: She has a normal mood and affect.      PATHOLOGY:  Outside pathology slides from [facility] were requested and reviewed neoplasm. Neville Brochure is bilateral moderate to severe hydroureteronephrosis. No renal calculus is seen.  Evaluation for an obstructing lesion is limited due to lack of IV contrast. Mild bilateral pelvic, right inguinal and retroperitoneal lymphadenopathy, likely despite reconstructive efforts.     Other risks of the surgery were again discussed in detail including medical and anesthetic complications (e.g. heart attack, stroke, deep vein thrombosis, pulmonary embolism, infection (pneumonia, etc), bleeding with poss

## 2019-10-28 ENCOUNTER — TELEPHONE (OUTPATIENT)
Dept: ENDOCRINOLOGY CLINIC | Facility: CLINIC | Age: 69
End: 2019-10-28

## 2019-10-28 RX ORDER — INSULIN HUMAN 100 [IU]/ML
INJECTION, SUSPENSION SUBCUTANEOUS
Qty: 15 ML | Refills: 0 | Status: SHIPPED | OUTPATIENT
Start: 2019-10-28 | End: 2019-10-29

## 2019-10-28 NOTE — TELEPHONE ENCOUNTER
Novolin 70/30 flexpen dose  24u in the AM and 15u--> decrease to 10 units in the PM.  Call if she gets low Bg again or over 200   Please discuss, if she has symptoms of low BG, she should check her BG , treat with 15 gm carsb like 4 ounces whole milk or ju

## 2019-10-28 NOTE — TELEPHONE ENCOUNTER
Patient states the last 3 nights in a row she's been having low BG that wakes her up in her sleep. She corrects with 2 peppermint candies and when she wakes up in the morning she is better.  She states it's typically around 2:30-3am.     10/26 - 60  10/27 -

## 2019-10-28 NOTE — TELEPHONE ENCOUNTER
Pt understands insulin dose decrease to 10u in the PM and to call with abnormal sugars. RN Reviewed symptoms and treatment of hypoglycemia.

## 2019-10-29 ENCOUNTER — OFFICE VISIT (OUTPATIENT)
Dept: INTERNAL MEDICINE CLINIC | Facility: CLINIC | Age: 69
End: 2019-10-29
Payer: MEDICARE

## 2019-10-29 ENCOUNTER — TELEPHONE (OUTPATIENT)
Dept: ENDOCRINOLOGY CLINIC | Facility: CLINIC | Age: 69
End: 2019-10-29

## 2019-10-29 VITALS
DIASTOLIC BLOOD PRESSURE: 83 MMHG | HEART RATE: 80 BPM | WEIGHT: 180 LBS | BODY MASS INDEX: 33.13 KG/M2 | RESPIRATION RATE: 17 BRPM | HEIGHT: 62 IN | SYSTOLIC BLOOD PRESSURE: 154 MMHG

## 2019-10-29 DIAGNOSIS — R80.9 TYPE 2 DIABETES MELLITUS WITH MICROALBUMINURIA, WITH LONG-TERM CURRENT USE OF INSULIN (HCC): ICD-10-CM

## 2019-10-29 DIAGNOSIS — C67.8 MALIGNANT NEOPLASM OF OVERLAPPING SITES OF BLADDER (HCC): ICD-10-CM

## 2019-10-29 DIAGNOSIS — I10 ESSENTIAL HYPERTENSION: ICD-10-CM

## 2019-10-29 DIAGNOSIS — Z79.4 TYPE 2 DIABETES MELLITUS WITH MICROALBUMINURIA, WITH LONG-TERM CURRENT USE OF INSULIN (HCC): ICD-10-CM

## 2019-10-29 DIAGNOSIS — Z01.818 PREOP EXAMINATION: Primary | ICD-10-CM

## 2019-10-29 DIAGNOSIS — E11.29 TYPE 2 DIABETES MELLITUS WITH MICROALBUMINURIA, WITH LONG-TERM CURRENT USE OF INSULIN (HCC): ICD-10-CM

## 2019-10-29 PROCEDURE — G0463 HOSPITAL OUTPT CLINIC VISIT: HCPCS | Performed by: INTERNAL MEDICINE

## 2019-10-29 PROCEDURE — 99214 OFFICE O/P EST MOD 30 MIN: CPT | Performed by: INTERNAL MEDICINE

## 2019-10-29 RX ORDER — HUMAN INSULIN 100 [IU]/ML
INJECTION, SUSPENSION SUBCUTANEOUS
Qty: 36 ML | Refills: 0 | Status: SHIPPED | OUTPATIENT
Start: 2019-10-29 | End: 2020-03-04

## 2019-10-29 NOTE — TELEPHONE ENCOUNTER
HUMULIN 70/30 KWIKPEN (70-30) 100 UNIT/ML Subcutaneous Suspension Pen-injector, INJECT 20 UNITS WITH BREAKFAST AND 10 UNITS WITH DINNER, Disp: 15 mL, Rfl: 0    Per pharmacy insurance doesn't cover Humulin , change to Novolin Pens pls advise

## 2019-10-29 NOTE — PROGRESS NOTES
Elian Slade is a 68year old female.   Patient presents with:  Pre-Op Exam      HPI:   Pt comes for pre op   C/c pre op   C/o bladder cancer -Squamous Cell   Requested by Dr. Maksim Barrera -date is not determined yet  radical cystectomy with pelvic lymph node diss UNIT/ML Subcutaneous Suspension Pen-injector, INJECT 20 UNITS WITH BREAKFAST AND 10 UNITS WITH DINNER, Disp: 15 mL, Rfl: 0  Insulin Pen Needle (PEN NEEDLES) 32G X 4 MM Does not apply Misc, 1 each by Does not apply route 2 (two) times daily. , Disp: 200 each cough, wheezing  CARDIOVASCULAR: denies chest pain on exertion, palpitations, swelling in feet  GI: denies abdominal pain and denies heartburn, nausea or vomiting  : No Pain on urination, change in the color of urine, discharge, urinating frequently  MUS normal , labs from August reviewed may need new ones once the date for the surgery  Patient has very good exercise tolerance--her most recent surgery was in 2017 bilateral mastectomy  Noted CT scan was done August 2019      The patient indicates understand

## 2019-11-04 ENCOUNTER — PATIENT MESSAGE (OUTPATIENT)
Dept: INTERNAL MEDICINE CLINIC | Facility: CLINIC | Age: 69
End: 2019-11-04

## 2019-11-04 NOTE — TELEPHONE ENCOUNTER
From: Linda Pimentel  To: London Perry MD  Sent: 11/4/2019 7:37 AM CST  Subject: Other    Dr. Brenda Grigsby can you please give me a call, I would like to speak with you in regarding some concerns I have about if/how I should proceed with the Surgery.      Thank you

## 2019-11-04 NOTE — TELEPHONE ENCOUNTER
Patient calling and states she will like for Dr. Jazmyne Otero to  explain what she is talking about with her surgery more clearer she states that Dr. Jose Ramon Morgan told her to go back to Dr. Tiana James at [facility] and she need clarification       Please advise

## 2019-11-06 ENCOUNTER — TELEPHONE (OUTPATIENT)
Dept: SURGERY | Facility: CLINIC | Age: 69
End: 2019-11-06

## 2019-11-07 NOTE — TELEPHONE ENCOUNTER
Dr. Vipul Villarreal pt is requesting to speak with you. From chart review, most likely in regards to what the plan will be for surgery. Thanks. LOV 10/23/19. Was dx with bladder squamous cell carcinoma. PLAN:  1.  Will decide whether surgery will be done h

## 2019-11-09 NOTE — TELEPHONE ENCOUNTER
Called patient. Spoke to her and answered her questions. Requesting that we call [place] to let them know that she will have her surgery there and that we are sending her back to Dr. Angel Jackson.

## 2019-11-12 NOTE — TELEPHONE ENCOUNTER
Called Dr. Boaz Harrell office at [facility] at [phone]. Their office is currently closed[facility] and ask if they need any records.

## 2019-11-14 NOTE — TELEPHONE ENCOUNTER
Called Dr. Rafael Griffiths office back spoke with Angle Alas, RN advised that per Dr. Geoff Pacheco he would like for patient to reurn back to see Dr. Rafael Griffiths to have surgery. Per Angle Alas she would call patient. I faxed over OV notes and imaging.

## 2019-12-04 ENCOUNTER — OFFICE VISIT (OUTPATIENT)
Dept: PODIATRY CLINIC | Facility: CLINIC | Age: 69
End: 2019-12-04
Payer: MEDICARE

## 2019-12-04 DIAGNOSIS — M79.674 PAIN IN TOES OF BOTH FEET: ICD-10-CM

## 2019-12-04 DIAGNOSIS — E11.29 TYPE 2 DIABETES MELLITUS WITH MICROALBUMINURIA, WITH LONG-TERM CURRENT USE OF INSULIN (HCC): Primary | ICD-10-CM

## 2019-12-04 DIAGNOSIS — B35.1 ONYCHOMYCOSIS: ICD-10-CM

## 2019-12-04 DIAGNOSIS — R80.9 TYPE 2 DIABETES MELLITUS WITH MICROALBUMINURIA, WITH LONG-TERM CURRENT USE OF INSULIN (HCC): Primary | ICD-10-CM

## 2019-12-04 DIAGNOSIS — M79.675 PAIN IN TOES OF BOTH FEET: ICD-10-CM

## 2019-12-04 DIAGNOSIS — Z79.4 TYPE 2 DIABETES MELLITUS WITH MICROALBUMINURIA, WITH LONG-TERM CURRENT USE OF INSULIN (HCC): Primary | ICD-10-CM

## 2019-12-04 PROCEDURE — 11721 DEBRIDE NAIL 6 OR MORE: CPT | Performed by: PODIATRIST

## 2019-12-04 NOTE — PROGRESS NOTES
HPI:    Patient ID: Mariangel Knott is a 68year old female. This 75-year-old female presents for care with her painful toenails. She saw Nolvia Seo on September 13, 2019. Her most recent A1c was 7.8 and her fasting blood sugar today was 120.       ROS: microalbuminuria, with long-term current use of insulin (hcc)  (primary encounter diagnosis)  Pain in toes of both feet  Onychomycosis    No orders of the defined types were placed in this encounter.       Meds This Visit:  Requested Prescriptions      No p

## 2020-03-04 ENCOUNTER — OFFICE VISIT (OUTPATIENT)
Dept: PODIATRY CLINIC | Facility: CLINIC | Age: 70
End: 2020-03-04
Payer: MEDICARE

## 2020-03-04 DIAGNOSIS — M79.674 PAIN IN TOES OF BOTH FEET: ICD-10-CM

## 2020-03-04 DIAGNOSIS — M79.675 PAIN IN TOES OF BOTH FEET: ICD-10-CM

## 2020-03-04 DIAGNOSIS — R80.9 TYPE 2 DIABETES MELLITUS WITH MICROALBUMINURIA, WITH LONG-TERM CURRENT USE OF INSULIN (HCC): Primary | ICD-10-CM

## 2020-03-04 DIAGNOSIS — B35.1 ONYCHOMYCOSIS: ICD-10-CM

## 2020-03-04 DIAGNOSIS — E11.29 TYPE 2 DIABETES MELLITUS WITH MICROALBUMINURIA, WITH LONG-TERM CURRENT USE OF INSULIN (HCC): Primary | ICD-10-CM

## 2020-03-04 DIAGNOSIS — Z79.4 TYPE 2 DIABETES MELLITUS WITH MICROALBUMINURIA, WITH LONG-TERM CURRENT USE OF INSULIN (HCC): Primary | ICD-10-CM

## 2020-03-04 PROCEDURE — 11721 DEBRIDE NAIL 6 OR MORE: CPT | Performed by: PODIATRIST

## 2020-03-04 RX ORDER — GLIPIZIDE 5 MG/1
TABLET ORAL
COMMUNITY
Start: 2020-01-29

## 2020-03-04 NOTE — PROGRESS NOTES
HPI:    Patient ID: Lizbeth George is a 65year old female. This 39-year-old female presents for care associated with her painful toenails. Patient is a diabetic and saw Estella Mitchell on October 15, 2019.   Her most recent A1c was 7.8 and her fasting blood sug encounter.       Meds This Visit:  Requested Prescriptions      No prescriptions requested or ordered in this encounter       Imaging & Referrals:  None       SX#[id number]

## 2020-07-01 ENCOUNTER — OFFICE VISIT (OUTPATIENT)
Dept: PODIATRY CLINIC | Facility: CLINIC | Age: 70
End: 2020-07-01
Payer: MEDICARE

## 2020-07-01 DIAGNOSIS — M79.674 PAIN IN TOES OF BOTH FEET: ICD-10-CM

## 2020-07-01 DIAGNOSIS — B35.1 ONYCHOMYCOSIS: ICD-10-CM

## 2020-07-01 DIAGNOSIS — R80.9 TYPE 2 DIABETES MELLITUS WITH MICROALBUMINURIA, WITH LONG-TERM CURRENT USE OF INSULIN (HCC): Primary | ICD-10-CM

## 2020-07-01 DIAGNOSIS — E11.29 TYPE 2 DIABETES MELLITUS WITH MICROALBUMINURIA, WITH LONG-TERM CURRENT USE OF INSULIN (HCC): Primary | ICD-10-CM

## 2020-07-01 DIAGNOSIS — Z79.4 TYPE 2 DIABETES MELLITUS WITH MICROALBUMINURIA, WITH LONG-TERM CURRENT USE OF INSULIN (HCC): Primary | ICD-10-CM

## 2020-07-01 DIAGNOSIS — M79.675 PAIN IN TOES OF BOTH FEET: ICD-10-CM

## 2020-07-01 PROCEDURE — 11721 DEBRIDE NAIL 6 OR MORE: CPT | Performed by: PODIATRIST

## 2020-07-01 NOTE — PROGRESS NOTES
HPI:    Patient ID: Sarita Hamilton is a 65year old female. 22-year-old female presents and asks that I care for her painful toenails. She reports relief by previous treatment. The last time I saw this patient was March 4, 2020.   She is diabetic and her m Prescriptions      No prescriptions requested or ordered in this encounter       Imaging & Referrals:  None       DV#[id number]

## 2020-10-07 ENCOUNTER — OFFICE VISIT (OUTPATIENT)
Dept: PODIATRY CLINIC | Facility: CLINIC | Age: 70
End: 2020-10-07
Payer: MEDICARE

## 2020-10-07 VITALS — BODY MASS INDEX: 33.13 KG/M2 | HEIGHT: 62 IN | WEIGHT: 180 LBS

## 2020-10-07 DIAGNOSIS — R80.9 TYPE 2 DIABETES MELLITUS WITH MICROALBUMINURIA, WITH LONG-TERM CURRENT USE OF INSULIN (HCC): Primary | ICD-10-CM

## 2020-10-07 DIAGNOSIS — B35.1 ONYCHOMYCOSIS: ICD-10-CM

## 2020-10-07 DIAGNOSIS — Z79.4 TYPE 2 DIABETES MELLITUS WITH MICROALBUMINURIA, WITH LONG-TERM CURRENT USE OF INSULIN (HCC): Primary | ICD-10-CM

## 2020-10-07 DIAGNOSIS — M79.674 PAIN IN TOES OF BOTH FEET: ICD-10-CM

## 2020-10-07 DIAGNOSIS — M79.675 PAIN IN TOES OF BOTH FEET: ICD-10-CM

## 2020-10-07 DIAGNOSIS — E11.29 TYPE 2 DIABETES MELLITUS WITH MICROALBUMINURIA, WITH LONG-TERM CURRENT USE OF INSULIN (HCC): Primary | ICD-10-CM

## 2020-10-07 PROCEDURE — 11721 DEBRIDE NAIL 6 OR MORE: CPT | Performed by: PODIATRIST

## 2020-10-07 NOTE — PROGRESS NOTES
HPI:    Patient ID: Lulu Moore is a 65year old female. This 56-year-old diabetic presents for care associated with her painful toenails. The last time I saw this patient was July 1 of this year.   She has not seen her primary care physician since that Referrals:  None       MP#[id number]

## 2021-01-13 ENCOUNTER — OFFICE VISIT (OUTPATIENT)
Dept: PODIATRY CLINIC | Facility: CLINIC | Age: 71
End: 2021-01-13
Payer: MEDICARE

## 2021-01-13 DIAGNOSIS — M79.674 PAIN IN TOES OF BOTH FEET: ICD-10-CM

## 2021-01-13 DIAGNOSIS — Z79.4 TYPE 2 DIABETES MELLITUS WITH MICROALBUMINURIA, WITH LONG-TERM CURRENT USE OF INSULIN (HCC): Primary | ICD-10-CM

## 2021-01-13 DIAGNOSIS — B35.1 ONYCHOMYCOSIS: ICD-10-CM

## 2021-01-13 DIAGNOSIS — M79.675 PAIN IN TOES OF BOTH FEET: ICD-10-CM

## 2021-01-13 DIAGNOSIS — R80.9 TYPE 2 DIABETES MELLITUS WITH MICROALBUMINURIA, WITH LONG-TERM CURRENT USE OF INSULIN (HCC): Primary | ICD-10-CM

## 2021-01-13 DIAGNOSIS — E11.29 TYPE 2 DIABETES MELLITUS WITH MICROALBUMINURIA, WITH LONG-TERM CURRENT USE OF INSULIN (HCC): Primary | ICD-10-CM

## 2021-01-13 PROCEDURE — 11721 DEBRIDE NAIL 6 OR MORE: CPT | Performed by: PODIATRIST

## 2021-01-13 NOTE — PROGRESS NOTES
HPI:    Patient ID: Rafiq Shelton is a 65year old female. 68-year-old diabetic presents for care associated with her painful toenails. The last time I saw this patient was October 7. She reports relief from previous care.   Patient is a diabetic and she microalbuminuria, with long-term current use of insulin (hcc)  (primary encounter diagnosis)  Pain in toes of both feet  Onychomycosis    No orders of the defined types were placed in this encounter.       Meds This Visit:  Requested Prescriptions      No p

## 2021-01-29 DIAGNOSIS — Z23 NEED FOR VACCINATION: ICD-10-CM

## 2021-04-14 ENCOUNTER — OFFICE VISIT (OUTPATIENT)
Dept: PODIATRY CLINIC | Facility: CLINIC | Age: 71
End: 2021-04-14
Payer: MEDICARE

## 2021-04-14 DIAGNOSIS — B35.1 ONYCHOMYCOSIS: ICD-10-CM

## 2021-04-14 DIAGNOSIS — M79.674 PAIN IN TOES OF BOTH FEET: ICD-10-CM

## 2021-04-14 DIAGNOSIS — M79.675 PAIN IN TOES OF BOTH FEET: ICD-10-CM

## 2021-04-14 DIAGNOSIS — E11.29 TYPE 2 DIABETES MELLITUS WITH MICROALBUMINURIA, WITH LONG-TERM CURRENT USE OF INSULIN (HCC): Primary | ICD-10-CM

## 2021-04-14 DIAGNOSIS — Z79.4 TYPE 2 DIABETES MELLITUS WITH MICROALBUMINURIA, WITH LONG-TERM CURRENT USE OF INSULIN (HCC): Primary | ICD-10-CM

## 2021-04-14 DIAGNOSIS — R80.9 TYPE 2 DIABETES MELLITUS WITH MICROALBUMINURIA, WITH LONG-TERM CURRENT USE OF INSULIN (HCC): Primary | ICD-10-CM

## 2021-04-14 PROCEDURE — 11721 DEBRIDE NAIL 6 OR MORE: CPT | Performed by: PODIATRIST

## 2021-04-14 NOTE — PROGRESS NOTES
HPI:    Patient ID: Abi Darnell is a 66year old female. 42-year-old diabetic presents for care associated with her painful toenails. The last time I saw this patient was January 13 of this year. She reports relief by previous care.   She saw Elise Vora on Ma feet  Onychomycosis    No orders of the defined types were placed in this encounter.       Meds This Visit:  Requested Prescriptions      No prescriptions requested or ordered in this encounter       Imaging & Referrals:  None       [id number]

## 2021-04-26 NOTE — ED INITIAL ASSESSMENT (HPI)
Intermittent RLQ pain starting in July, with constipation and urinary frequency worsening the past few days. Denies N/V, or blood in stool. Dx of bladder CA w/o treatment. Also hx of diverticulosis. Labs/EKG

## 2021-07-14 ENCOUNTER — OFFICE VISIT (OUTPATIENT)
Dept: PODIATRY CLINIC | Facility: CLINIC | Age: 71
End: 2021-07-14
Payer: MEDICARE

## 2021-07-14 DIAGNOSIS — M79.674 PAIN IN TOES OF BOTH FEET: ICD-10-CM

## 2021-07-14 DIAGNOSIS — R80.9 TYPE 2 DIABETES MELLITUS WITH MICROALBUMINURIA, WITH LONG-TERM CURRENT USE OF INSULIN (HCC): Primary | ICD-10-CM

## 2021-07-14 DIAGNOSIS — Z79.4 TYPE 2 DIABETES MELLITUS WITH MICROALBUMINURIA, WITH LONG-TERM CURRENT USE OF INSULIN (HCC): Primary | ICD-10-CM

## 2021-07-14 DIAGNOSIS — E11.29 TYPE 2 DIABETES MELLITUS WITH MICROALBUMINURIA, WITH LONG-TERM CURRENT USE OF INSULIN (HCC): Primary | ICD-10-CM

## 2021-07-14 DIAGNOSIS — B35.1 ONYCHOMYCOSIS: ICD-10-CM

## 2021-07-14 DIAGNOSIS — M79.675 PAIN IN TOES OF BOTH FEET: ICD-10-CM

## 2021-07-14 PROCEDURE — 11721 DEBRIDE NAIL 6 OR MORE: CPT | Performed by: PODIATRIST

## 2021-07-14 NOTE — PROGRESS NOTES
HPI:    Patient ID: Domingo Paulson is a 66year old female. 27-year-old diabetic presents for care associated with her painful toenails. The last time I saw this patient was April 14. She reports relief by previous care.   She saw Dr. Malu Kinney on March 2 of this diabetes mellitus with microalbuminuria, with long-term current use of insulin (hcc)  (primary encounter diagnosis)  Pain in toes of both feet  Onychomycosis    No orders of the defined types were placed in this encounter.       Meds This Visit:  Requested

## 2021-10-20 ENCOUNTER — OFFICE VISIT (OUTPATIENT)
Dept: PODIATRY CLINIC | Facility: CLINIC | Age: 71
End: 2021-10-20
Payer: MEDICARE

## 2021-10-20 DIAGNOSIS — M79.674 PAIN IN TOES OF BOTH FEET: ICD-10-CM

## 2021-10-20 DIAGNOSIS — R80.9 TYPE 2 DIABETES MELLITUS WITH MICROALBUMINURIA, WITH LONG-TERM CURRENT USE OF INSULIN (HCC): Primary | ICD-10-CM

## 2021-10-20 DIAGNOSIS — Z79.4 TYPE 2 DIABETES MELLITUS WITH MICROALBUMINURIA, WITH LONG-TERM CURRENT USE OF INSULIN (HCC): Primary | ICD-10-CM

## 2021-10-20 DIAGNOSIS — B35.1 ONYCHOMYCOSIS: ICD-10-CM

## 2021-10-20 DIAGNOSIS — M79.675 PAIN IN TOES OF BOTH FEET: ICD-10-CM

## 2021-10-20 DIAGNOSIS — E11.29 TYPE 2 DIABETES MELLITUS WITH MICROALBUMINURIA, WITH LONG-TERM CURRENT USE OF INSULIN (HCC): Primary | ICD-10-CM

## 2021-10-20 PROCEDURE — 11721 DEBRIDE NAIL 6 OR MORE: CPT | Performed by: PODIATRIST

## 2021-10-20 NOTE — PROGRESS NOTES
HPI:    Patient ID: Lulu Moore is a 66year old female. This 43-year-old diabetic presents with recurrent symptoms associated with all of her toenails. She saw Chelsea Gongora on September 28.  Her most recent A1c was 7.8 and her fasting blood sugar this mornin Visit:  Requested Prescriptions      No prescriptions requested or ordered in this encounter       Imaging & Referrals:  None       OQ#[id number]

## 2022-01-26 ENCOUNTER — OFFICE VISIT (OUTPATIENT)
Dept: PODIATRY CLINIC | Facility: CLINIC | Age: 72
End: 2022-01-26
Payer: MEDICARE

## 2022-01-26 DIAGNOSIS — R80.9 TYPE 2 DIABETES MELLITUS WITH MICROALBUMINURIA, WITH LONG-TERM CURRENT USE OF INSULIN (HCC): Primary | ICD-10-CM

## 2022-01-26 DIAGNOSIS — Z79.4 TYPE 2 DIABETES MELLITUS WITH MICROALBUMINURIA, WITH LONG-TERM CURRENT USE OF INSULIN (HCC): Primary | ICD-10-CM

## 2022-01-26 DIAGNOSIS — M79.675 PAIN IN TOES OF BOTH FEET: ICD-10-CM

## 2022-01-26 DIAGNOSIS — E11.29 TYPE 2 DIABETES MELLITUS WITH MICROALBUMINURIA, WITH LONG-TERM CURRENT USE OF INSULIN (HCC): Primary | ICD-10-CM

## 2022-01-26 DIAGNOSIS — B35.1 ONYCHOMYCOSIS: ICD-10-CM

## 2022-01-26 DIAGNOSIS — M79.674 PAIN IN TOES OF BOTH FEET: ICD-10-CM

## 2022-01-26 PROCEDURE — 11721 DEBRIDE NAIL 6 OR MORE: CPT | Performed by: PODIATRIST

## 2022-01-26 NOTE — PROGRESS NOTES
HPI:    Patient ID: Briseyda Mckay is a 66year old female. 66-year-old diabetic presents with recurrent symptoms associated with her toenails. The last time I saw this patient was October 20. She had relief by previous care.   She saw Chitra Johnson on December Visit:  Requested Prescriptions      No prescriptions requested or ordered in this encounter       Imaging & Referrals:  None       QB#[id number]

## 2022-04-27 ENCOUNTER — OFFICE VISIT (OUTPATIENT)
Dept: PODIATRY CLINIC | Facility: CLINIC | Age: 72
End: 2022-04-27
Payer: MEDICARE

## 2022-04-27 DIAGNOSIS — E11.29 TYPE 2 DIABETES MELLITUS WITH MICROALBUMINURIA, WITH LONG-TERM CURRENT USE OF INSULIN (HCC): Primary | ICD-10-CM

## 2022-04-27 DIAGNOSIS — M79.674 PAIN IN TOES OF BOTH FEET: ICD-10-CM

## 2022-04-27 DIAGNOSIS — R80.9 TYPE 2 DIABETES MELLITUS WITH MICROALBUMINURIA, WITH LONG-TERM CURRENT USE OF INSULIN (HCC): Primary | ICD-10-CM

## 2022-04-27 DIAGNOSIS — M79.675 PAIN IN TOES OF BOTH FEET: ICD-10-CM

## 2022-04-27 DIAGNOSIS — B35.1 ONYCHOMYCOSIS: ICD-10-CM

## 2022-04-27 DIAGNOSIS — Z79.4 TYPE 2 DIABETES MELLITUS WITH MICROALBUMINURIA, WITH LONG-TERM CURRENT USE OF INSULIN (HCC): Primary | ICD-10-CM

## 2022-04-27 PROCEDURE — 11721 DEBRIDE NAIL 6 OR MORE: CPT | Performed by: PODIATRIST

## 2022-07-27 ENCOUNTER — OFFICE VISIT (OUTPATIENT)
Dept: PODIATRY CLINIC | Facility: CLINIC | Age: 72
End: 2022-07-27
Payer: MEDICARE

## 2022-07-27 DIAGNOSIS — R80.9 TYPE 2 DIABETES MELLITUS WITH MICROALBUMINURIA, WITH LONG-TERM CURRENT USE OF INSULIN (HCC): Primary | ICD-10-CM

## 2022-07-27 DIAGNOSIS — E11.29 TYPE 2 DIABETES MELLITUS WITH MICROALBUMINURIA, WITH LONG-TERM CURRENT USE OF INSULIN (HCC): Primary | ICD-10-CM

## 2022-07-27 DIAGNOSIS — Z79.4 TYPE 2 DIABETES MELLITUS WITH MICROALBUMINURIA, WITH LONG-TERM CURRENT USE OF INSULIN (HCC): Primary | ICD-10-CM

## 2022-07-27 DIAGNOSIS — M79.674 PAIN IN TOES OF BOTH FEET: ICD-10-CM

## 2022-07-27 DIAGNOSIS — B35.1 ONYCHOMYCOSIS: ICD-10-CM

## 2022-07-27 DIAGNOSIS — M79.675 PAIN IN TOES OF BOTH FEET: ICD-10-CM

## 2022-07-27 PROCEDURE — 11721 DEBRIDE NAIL 6 OR MORE: CPT | Performed by: PODIATRIST

## 2022-07-27 RX ORDER — GLIPIZIDE 10 MG/1
10 TABLET ORAL
COMMUNITY
Start: 2022-04-27

## 2022-10-26 ENCOUNTER — OFFICE VISIT (OUTPATIENT)
Dept: PODIATRY CLINIC | Facility: CLINIC | Age: 72
End: 2022-10-26
Payer: MEDICARE

## 2022-10-26 DIAGNOSIS — M79.674 PAIN IN TOES OF BOTH FEET: ICD-10-CM

## 2022-10-26 DIAGNOSIS — M79.675 PAIN IN TOES OF BOTH FEET: ICD-10-CM

## 2022-10-26 DIAGNOSIS — B35.1 ONYCHOMYCOSIS: ICD-10-CM

## 2022-10-26 DIAGNOSIS — R80.9 TYPE 2 DIABETES MELLITUS WITH MICROALBUMINURIA, WITH LONG-TERM CURRENT USE OF INSULIN (HCC): Primary | ICD-10-CM

## 2022-10-26 DIAGNOSIS — E11.29 TYPE 2 DIABETES MELLITUS WITH MICROALBUMINURIA, WITH LONG-TERM CURRENT USE OF INSULIN (HCC): Primary | ICD-10-CM

## 2022-10-26 DIAGNOSIS — Z79.4 TYPE 2 DIABETES MELLITUS WITH MICROALBUMINURIA, WITH LONG-TERM CURRENT USE OF INSULIN (HCC): Primary | ICD-10-CM

## 2022-10-26 PROCEDURE — 11721 DEBRIDE NAIL 6 OR MORE: CPT | Performed by: PODIATRIST

## 2023-01-27 ENCOUNTER — OFFICE VISIT (OUTPATIENT)
Dept: PODIATRY CLINIC | Facility: CLINIC | Age: 73
End: 2023-01-27

## 2023-01-27 DIAGNOSIS — B35.1 ONYCHOMYCOSIS: Primary | ICD-10-CM

## 2023-01-27 DIAGNOSIS — E11.9 TYPE 2 DIABETES MELLITUS WITHOUT RETINOPATHY (HCC): ICD-10-CM

## 2023-01-27 DIAGNOSIS — M79.674 TOE PAIN, BILATERAL: ICD-10-CM

## 2023-01-27 DIAGNOSIS — M79.675 TOE PAIN, BILATERAL: ICD-10-CM

## 2023-01-27 PROCEDURE — 99213 OFFICE O/P EST LOW 20 MIN: CPT | Performed by: PODIATRIST

## 2023-04-28 ENCOUNTER — OFFICE VISIT (OUTPATIENT)
Dept: PODIATRY CLINIC | Facility: CLINIC | Age: 73
End: 2023-04-28

## 2023-04-28 DIAGNOSIS — E11.9 TYPE 2 DIABETES MELLITUS WITHOUT RETINOPATHY (HCC): ICD-10-CM

## 2023-04-28 DIAGNOSIS — B35.1 ONYCHOMYCOSIS: Primary | ICD-10-CM

## 2023-04-28 DIAGNOSIS — M79.674 TOE PAIN, BILATERAL: ICD-10-CM

## 2023-04-28 DIAGNOSIS — M79.675 TOE PAIN, BILATERAL: ICD-10-CM

## 2023-04-28 PROCEDURE — 99213 OFFICE O/P EST LOW 20 MIN: CPT | Performed by: PODIATRIST

## 2023-04-28 PROCEDURE — 1126F AMNT PAIN NOTED NONE PRSNT: CPT | Performed by: PODIATRIST

## 2023-04-28 RX ORDER — INSULIN GLARGINE 100 [IU]/ML
20 INJECTION, SOLUTION SUBCUTANEOUS NIGHTLY
COMMUNITY
Start: 2023-02-28

## 2023-08-07 ENCOUNTER — OFFICE VISIT (OUTPATIENT)
Dept: PODIATRY CLINIC | Facility: CLINIC | Age: 73
End: 2023-08-07

## 2023-08-07 DIAGNOSIS — E11.9 TYPE 2 DIABETES MELLITUS WITHOUT RETINOPATHY (HCC): ICD-10-CM

## 2023-08-07 DIAGNOSIS — B35.1 ONYCHOMYCOSIS: Primary | ICD-10-CM

## 2023-08-07 DIAGNOSIS — M79.675 TOE PAIN, BILATERAL: ICD-10-CM

## 2023-08-07 DIAGNOSIS — M79.674 TOE PAIN, BILATERAL: ICD-10-CM

## 2023-08-07 PROCEDURE — 1159F MED LIST DOCD IN RCRD: CPT | Performed by: PODIATRIST

## 2023-08-07 PROCEDURE — 99213 OFFICE O/P EST LOW 20 MIN: CPT | Performed by: PODIATRIST

## 2023-08-07 PROCEDURE — 1126F AMNT PAIN NOTED NONE PRSNT: CPT | Performed by: PODIATRIST

## 2023-08-07 NOTE — PROGRESS NOTES
[address] [facility] Podiatry  Progress Note    Katlin Maciel is a 74year old female. Patient presents with:  Diabetic Foot Care: Nail trim and foot check f/u- FBS this AM= 135- denies any pain at this time        HPI:     This is a pleasant diabetic female. She denies any neuropathy type symptoms. She presents to clinic today for diabetic foot care. She complains of long thick painful toenails which she is unable to trim herself. Allergies: Lisinopril   Current Outpatient Medications   Medication Sig Dispense Refill    LANTUS SOLOSTAR 100 UNIT/ML Subcutaneous Solution Pen-injector Inject 20 Units into the skin nightly. Insulin NPH & Regular 70/30 (70-30) 100 UNIT/ML Subcutaneous Suspension Inject 12 units every 12 hours with breakfast and dinner      glipiZIDE 10 MG Oral Tab Take 1 tablet (10 mg total) by mouth 2 (two) times daily before meals. ONETOUCH DELICA LANCETS FINE Does not apply Misc by Other route. Test twice daily  8    Glucose Blood (ONETOUCH ULTRA BLUE) In Vitro Strip TEST BLOOD SUGAR 2 TIMES A DAY  2    ONETOUCH ULTRA 2 w/Device Does not apply Kit Check blood sugar daily. 1 kit 0    ergocalciferol 37754 units Oral Cap TAKE 1 CAP BY MOUTH WEEKLY. 3    losartan 100 MG Oral Tab Take 1 tablet (100 mg total) by mouth once daily. 2      Past Medical History:   Diagnosis Date    Bladder cancer ([address]     SCC in situ, Ta LG, squamous dysplasia     Breast cancer ([address] ,     initial Dx in , Tx. with lumpectomy. Recurrence in , s/p b/l mastectomy. CVA (cerebral vascular accident) ([address]     no residual deficit    Diabetes ([address]     Diverticulosis of large intestine     Essential hypertension       Past Surgical History:   Procedure Laterality Date    BREAST LUMPECTOMY Left 2010    + adjuvant RT.  Declined hormonal therapy          x2    CYSTOURETHROSCOPY,FULGUR .5-2CM BEBA      @ [facility] (2017, 2017, 2018, 10/2018, 2019)    HYSTERECTOMY      MASTECTOMY MODIFIED RADICAL Bilateral 2017    declined adjuvant hormonal therapy    VAGINAL HYSTERECTOMY        Family History   Problem Relation Age of Onset    Diabetes Father         Type 2      Social History    Socioeconomic History      Marital status:     Tobacco Use      Smoking status: Former        Packs/day: 0.50        Years: 30.00        Pack years: 15        Types: Cigarettes        Quit date: 2010        Years since quittin.6      Smokeless tobacco: Never    Substance and Sexual Activity      Alcohol use: Yes          REVIEW OF SYSTEMS:   Denies nausea, fever, chills  No calf pain  No other muscle or joint aches  Denies chest pain or shortness of breath. EXAM:   There were no vitals taken for this visit. Constitutional:   Patient in no apparent distress. Well kept. Of normal body habitus. Alert and oriented to person, place, and time. Vascular Examination:  DP pulse is 2/4  PT pulse is NP  Capillary refill is immediate  Edema is present bilateral  Integumentary Examination:   The patient's nails appear incurvated, thickened, elongated, dystrophic, discolored with subungual debris 1-5 right, 1-5  left nails. Digital hair growth is present left and is present right. Skin is of diminished texture and decreased turgor. Neurological Examination:  Monofilament (10-g) sensation is 5/5 to right and 5/5 to left. Sharp/dull is present to right and is present to left. Parasthesias absent. Musculoskeletal Examination:  Muscle Strength is 5/5. B/L hallux rigidus  Hammer digit deformity present digits 2-5  bilateral.  Pain on palpation to nails.           LABS & IMAGING:     Lab Results   Component Value Date     (H) 2019    BUN 7 2019    CREATSERUM 0.96 2019    BUNCREA 7.3 (L) 2019    ANIONGAP 8 2019    GFRAA 69 2019    GFRNAA 60 2019    CA 9.6 2019     2019    K 3.8 2019     2019    CO2 29.0 2019 OSMOCALC 299 (H) 08/08/2019        Lab Results   Component Value Date     (H) 08/15/2019    A1C 7.8 (A) 10/15/2019        No results found. ASSESSMENT AND PLAN:   Diagnoses and all orders for this visit:    Onychomycosis    Toe pain, bilateral    Type 2 diabetes mellitus without retinopathy ([facility]          Plan:     Diabetic education and instructions have been provided. We reviewed and discussed the following:    -risk categories related to pts with diabetes and foot or lower extremity complications per ADA. -adherence to medication regimen and close monitoring or blood sugar control.   -daily monitoring/inspection of feet and shoes.   -proper management of diet and weight   -regular follow up with PCP and specialty providers as recommended   -Lower extremity complications related to DM were reviewed and stressed prevention. Evaluated the patient. Discussed treatment options with the patient. Discussed with patient proper care and hygiene for their feet. Patient tolerated procedures well, without incident. Instrumentation used includes nail nippers and electric  where appropriate. Procedure: (56149 Debridement of toenails 6-10) Surgically debrided and mechanically reduced 6 or more toenails. Hemmorhage occurred none. Nails that were debrided appeared dystrophic and caused the patient pain in shoe gear. Nails 5 Left & 5 Right. RTC 3 months for [facility]    No follow-ups on file.     Camilla Schafer DPM  8/7/23

## 2023-11-14 ENCOUNTER — OFFICE VISIT (OUTPATIENT)
Dept: PODIATRY CLINIC | Facility: CLINIC | Age: 73
End: 2023-11-14

## 2023-11-14 DIAGNOSIS — B35.1 ONYCHOMYCOSIS: Primary | ICD-10-CM

## 2023-11-14 DIAGNOSIS — M79.674 TOE PAIN, BILATERAL: ICD-10-CM

## 2023-11-14 DIAGNOSIS — E11.9 TYPE 2 DIABETES MELLITUS WITHOUT RETINOPATHY (HCC): ICD-10-CM

## 2023-11-14 DIAGNOSIS — M79.675 TOE PAIN, BILATERAL: ICD-10-CM

## 2023-11-14 PROCEDURE — 1159F MED LIST DOCD IN RCRD: CPT | Performed by: PODIATRIST

## 2023-11-14 PROCEDURE — 99213 OFFICE O/P EST LOW 20 MIN: CPT | Performed by: PODIATRIST

## 2023-11-14 PROCEDURE — 1126F AMNT PAIN NOTED NONE PRSNT: CPT | Performed by: PODIATRIST

## 2023-11-14 RX ORDER — EMPAGLIFLOZIN 25 MG/1
25 TABLET, FILM COATED ORAL DAILY
COMMUNITY
Start: 2023-09-07

## 2023-11-14 NOTE — PROGRESS NOTES
[facility] Podiatry  Progress Note    Merlin Lemos is a 74year old female. Chief Complaint   Patient presents with    Diabetic Foot Care     3 mo f/u - last OwN4X=0.1 from 23 - has no c/o regarding his feet - this AM his XC=576         HPI:     This is a pleasant diabetic female. She denies any neuropathy type symptoms. She presents to clinic today for diabetic foot care. She complains of long thick painful toenails which she is unable to trim herself. Allergies: Lisinopril   Current Outpatient Medications   Medication Sig Dispense Refill    JARDIANCE 25 MG Oral Tab Take 25 mg by mouth daily. SITagliptin Phosphate 100 MG Oral Tab Take 1 tablet (100 mg total) by mouth daily. glipiZIDE 10 MG Oral Tab Take 1 tablet (10 mg total) by mouth 2 (two) times daily before meals. ONETOUCH DELICA LANCETS FINE Does not apply Misc by Other route. Test twice daily  8    Glucose Blood (ONETOUCH ULTRA BLUE) In Vitro Strip TEST BLOOD SUGAR 2 TIMES A DAY  2    ONETOUCH ULTRA 2 w/Device Does not apply Kit Check blood sugar daily. 1 kit 0    ergocalciferol 70264 units Oral Cap TAKE 1 CAP BY MOUTH WEEKLY. 3    losartan 100 MG Oral Tab Take 1 tablet (100 mg total) by mouth once daily. 2      Past Medical History:   Diagnosis Date    Bladder cancer ([address]     SCC in situ, Ta LG, squamous dysplasia     Breast cancer ([address] ,     initial Dx in , Tx. with lumpectomy. Recurrence in , s/p b/l mastectomy. CVA (cerebral vascular accident) ([address]     no residual deficit    Diabetes ([address]     Diverticulosis of large intestine     Essential hypertension       Past Surgical History:   Procedure Laterality Date    BREAST LUMPECTOMY Left 2010    + adjuvant RT.  Declined hormonal therapy          x2    CYSTOURETHROSCOPY,FULGUR .5-2CM BEBA      @ [facility] (2017, 2017, 2018, 10/2018, 2019)    HYSTERECTOMY      MASTECTOMY MODIFIED RADICAL Bilateral 2017    declined adjuvant hormonal therapy    VAGINAL HYSTERECTOMY        Family History   Problem Relation Age of Onset    Diabetes Father         Type 2      Social History     Socioeconomic History    Marital status:    Tobacco Use    Smoking status: Former     Packs/day: 0.50     Years: 30.00     Additional pack years: 0.00     Total pack years: 15.00     Types: Cigarettes     Quit date: 2010     Years since quittin.8    Smokeless tobacco: Never   Substance and Sexual Activity    Alcohol use: Yes           REVIEW OF SYSTEMS:   Denies nausea, fever, chills  No calf pain  No other muscle or joint aches  Denies chest pain or shortness of breath. EXAM:   There were no vitals taken for this visit. Constitutional:   Patient in no apparent distress. Well kept. Of normal body habitus. Alert and oriented to person, place, and time. Vascular Examination:  DP pulse is 2/4  PT pulse is NP  Capillary refill is immediate  Edema is present bilateral  Integumentary Examination:   The patient's nails appear incurvated, thickened, elongated, dystrophic, discolored with subungual debris 1-5 right, 1-5  left nails. Digital hair growth is present left and is present right. Skin is of diminished texture and decreased turgor. Neurological Examination:  Monofilament (10-g) sensation is 5/5 to right and 5/5 to left. Sharp/dull is present to right and is present to left. Parasthesias absent. Musculoskeletal Examination:  Muscle Strength is 5/5. B/L hallux rigidus  Hammer digit deformity present digits 2-5  bilateral.  Pain on palpation to nails.           LABS & IMAGING:     Lab Results   Component Value Date     (H) 2019    BUN 7 2019    CREATSERUM 0.96 2019    BUNCREA 7.3 (L) 2019    ANIONGAP 8 2019    GFRAA 69 2019    GFRNAA 60 2019    CA 9.6 2019     2019    K 3.8 2019     2019    CO2 29.0 2019    OSMOCALC 299 (H) 2019        Lab Results   Component Value Date     (H) 08/15/2019    A1C 7.8 (A) 10/15/2019        No results found. ASSESSMENT AND PLAN:   Diagnoses and all orders for this visit:    Onychomycosis    Toe pain, bilateral    Type 2 diabetes mellitus without retinopathy ([facility] [address]            Plan:     Diabetic education and instructions have been provided. We reviewed and discussed the following:    -risk categories related to pts with diabetes and foot or lower extremity complications per ADA. -adherence to medication regimen and close monitoring or blood sugar control.   -daily monitoring/inspection of feet and shoes.   -proper management of diet and weight   -regular follow up with PCP and specialty providers as recommended   -Lower extremity complications related to DM were reviewed and stressed prevention. Evaluated the patient. Discussed treatment options with the patient. Discussed with patient proper care and hygiene for their feet. Patient tolerated procedures well, without incident. Instrumentation used includes nail nippers and electric  where appropriate. Procedure: (38224 Debridement of toenails 6-10) Surgically debrided and mechanically reduced 6 or more toenails. Hemmorhage occurred none. Nails that were debrided appeared dystrophic and caused the patient pain in shoe gear. Nails 5 Left & 5 Right. RTC 4 months for [facility]    No follow-ups on file.     Javon Carey DPM  11/14/23

## 2024-03-15 ENCOUNTER — OFFICE VISIT (OUTPATIENT)
Dept: PODIATRY CLINIC | Facility: CLINIC | Age: 74
End: 2024-03-15

## 2024-03-15 DIAGNOSIS — E11.9 TYPE 2 DIABETES MELLITUS WITHOUT RETINOPATHY (HCC): ICD-10-CM

## 2024-03-15 DIAGNOSIS — M79.675 TOE PAIN, BILATERAL: ICD-10-CM

## 2024-03-15 DIAGNOSIS — B35.1 ONYCHOMYCOSIS: Primary | ICD-10-CM

## 2024-03-15 DIAGNOSIS — M79.674 TOE PAIN, BILATERAL: ICD-10-CM

## 2024-03-15 PROCEDURE — 1159F MED LIST DOCD IN RCRD: CPT | Performed by: PODIATRIST

## 2024-03-15 PROCEDURE — 99213 OFFICE O/P EST LOW 20 MIN: CPT | Performed by: PODIATRIST

## 2024-03-15 NOTE — PROGRESS NOTES
[facility] Podiatry  Progress Note    Thais Adair is a 74 year old female.   Chief Complaint   Patient presents with    Diabetic Foot Care     F/u Diabetic Foot Care.  this AM, on 24 A1C = 10.3         HPI:     This is a pleasant diabetic female.  She denies any neuropathy type symptoms.      She presents to clinic today for diabetic foot care.  She complains of long thick painful toenails which she is unable to trim herself.         Allergies: Lisinopril   Current Outpatient Medications   Medication Sig Dispense Refill    JARDIANCE 25 MG Oral Tab Take 25 mg by mouth daily.      SITagliptin Phosphate 100 MG Oral Tab Take 1 tablet (100 mg total) by mouth daily.      glipiZIDE 10 MG Oral Tab Take 1 tablet (10 mg total) by mouth 2 (two) times daily before meals.      ONETOUCH DELICA LANCETS FINE Does not apply Misc by Other route. Test twice daily  8    Glucose Blood (ONETOUCH ULTRA BLUE) In Vitro Strip TEST BLOOD SUGAR 2 TIMES A DAY  2    ONETOUCH ULTRA 2 w/Device Does not apply Kit Check blood sugar daily. 1 kit 0    ergocalciferol 98903 units Oral Cap TAKE 1 CAP BY MOUTH WEEKLY.  3    losartan 100 MG Oral Tab Take 1 tablet (100 mg total) by mouth once daily.  2      Past Medical History:   Diagnosis Date    Bladder cancer (HCC)     SCC in situ, Ta LG, squamous dysplasia     Breast cancer ([facility]CC) ,     initial Dx in , Tx. with lumpectomy. Recurrence in , s/p b/l mastectomy.    CVA (cerebral vascular accident) (HCC)     no residual deficit    Diabetes (HCC)     Diverticulosis of large intestine     Essential hypertension       Past Surgical History:   Procedure Laterality Date    BREAST LUMPECTOMY Left 2010    + adjuvant RT. Declined hormonal therapy          x2    CYSTOURETHROSCOPY,FULGUR .5-2CM BEBA      @ [facility] (2017, 2017, 2018, 10/2018, 2019)    HYSTERECTOMY      MASTECTOMY MODIFIED RADICAL Bilateral 2017    declined adjuvant hormonal therapy    VAGINAL  HYSTERECTOMY        Family History   Problem Relation Age of Onset    Diabetes Father         Type 2      Social History     Socioeconomic History    Marital status:    Tobacco Use    Smoking status: Former     Packs/day: 0.50     Years: 30.00     Additional pack years: 0.00     Total pack years: 15.00     Types: Cigarettes     Quit date: 2010     Years since quittin.2    Smokeless tobacco: Never   Substance and Sexual Activity    Alcohol use: Yes           REVIEW OF SYSTEMS:   Denies nausea, fever, chills  No calf pain  No other muscle or joint aches  Denies chest pain or shortness of breath.      EXAM:   There were no vitals taken for this visit.    Constitutional:   Patient in no apparent distress. Well kept. Of normal body habitus. Alert and oriented to person, place, and time.  Vascular Examination:  DP pulse is 2/4  PT pulse is NP  Capillary refill is immediate  Edema is present bilateral  Integumentary Examination:   The patient's nails appear incurvated, thickened, elongated, dystrophic, discolored with subungual debris 1-5 right, 1-5  left nails.  Digital hair growth is present left and is present right.  Skin is of diminished texture and decreased turgor.  Neurological Examination:  Monofilament (10-g) sensation is 5/5 to right and 5/5 to left.  Sharp/dull is present to right and is present to left.  Parasthesias absent.  Musculoskeletal Examination:  Muscle Strength is 5/5.  B/L hallux rigidus  Hammer digit deformity present digits 2-5  bilateral.  Pain on palpation to nails.          LABS & IMAGING:     Lab Results   Component Value Date     (H) 2019    BUN 7 2019    CREATSERUM 0.96 2019    BUNCREA 7.3 (L) 2019    ANIONGAP 8 2019    GFRAA 69 2019    GFRNAA 60 2019    CA 9.6 2019     2019    K 3.8 2019     2019    CO2 29.0 2019    OSMOCALC 299 (H) 2019        Lab Results   Component Value Date      (H) 08/15/2019    A1C 7.8 (A) 10/15/2019        No results found.     ASSESSMENT AND PLAN:   Diagnoses and all orders for this visit:    Onychomycosis    Toe pain, bilateral    Type 2 diabetes mellitus without retinopathy (HCC)              Plan:     Diabetic education and instructions have been provided. We reviewed and discussed the following:    -risk categories related to pts with diabetes and foot or lower extremity complications per ADA.    -adherence to medication regimen and close monitoring or blood sugar control.   -daily monitoring/inspection of feet and shoes.   -proper management of diet and weight   -regular follow up with PCP and specialty providers as recommended   -Lower extremity complications related to DM were reviewed and stressed prevention.     Evaluated the patient. Discussed treatment options with the patient.  Discussed with patient proper care and hygiene for their feet.  Patient tolerated procedures well, without incident.    Instrumentation used includes nail nippers and electric  where appropriate.  Procedure: (99622 Debridement of toenails 6-10) Surgically debrided and mechanically reduced 6 or more toenails.Hemmorhage occurred none.Nails that were debrided appeared dystrophic and caused the patient pain in shoe gear.Nails 5 Left & 5 Right.    RTC 3 months for [facility]    No follow-ups on file.    Jorgito Fung DPM  3/15/24

## (undated) NOTE — ED AVS SNAPSHOT
Tone Bates   MRN: [id number]    Department:  [facility] Emergency Department   Date of Visit:  7/30/2019           Disclosure     Insurance plans vary and the physician(s) referred by the ER may not be covered by your plan.  Please contact your within the next three months to obtain basic health screening including reassessment of your blood pressure.     IF THERE IS ANY CHANGE OR WORSENING OF YOUR CONDITION, CALL YOUR PRIMARY CARE PHYSICIAN AT ONCE OR RETURN IMMEDIATELY TO THE EMERGENCY DEPARTMEN

## (undated) NOTE — LETTER
September 4, 2019         Safia Matamoros, [address] [facility]      Patient: Harman Hernandez   YOB: 1950   Date of Visit: 9/4/2019       Dear Dr. Alisa Lou MD,    I saw your patient, Harman Hernandez, on 9/4/2019.  Enclosed is my consu

## (undated) NOTE — Clinical Note
Berenice, I saw Ms. Yonathan Kim in the clinic today. Her a1c is down to 7.8 %. She should be okay for surgery from a Diabetes standpoint. Thanks.  Corinne Fonseca

## (undated) NOTE — Clinical Note
Thank you for the consult. I saw  Ms. Jeffery Beltran in  the endocrine/diabetes clinic today. Please see attached my note. Please feel free to contact me with any questions. Thanks!